# Patient Record
Sex: FEMALE | Race: WHITE | NOT HISPANIC OR LATINO | Employment: FULL TIME | URBAN - NONMETROPOLITAN AREA
[De-identification: names, ages, dates, MRNs, and addresses within clinical notes are randomized per-mention and may not be internally consistent; named-entity substitution may affect disease eponyms.]

---

## 2017-03-11 ENCOUNTER — OFFICE VISIT (OUTPATIENT)
Dept: URGENT CARE | Facility: PHYSICIAN GROUP | Age: 52
End: 2017-03-11
Payer: COMMERCIAL

## 2017-03-11 VITALS
DIASTOLIC BLOOD PRESSURE: 86 MMHG | WEIGHT: 207 LBS | SYSTOLIC BLOOD PRESSURE: 132 MMHG | TEMPERATURE: 98.8 F | HEART RATE: 91 BPM | OXYGEN SATURATION: 95 % | BODY MASS INDEX: 37.85 KG/M2

## 2017-03-11 DIAGNOSIS — R07.89 CHEST WALL PAIN: ICD-10-CM

## 2017-03-11 DIAGNOSIS — R11.0 NAUSEA: ICD-10-CM

## 2017-03-11 DIAGNOSIS — R42 LIGHT HEADED: ICD-10-CM

## 2017-03-11 DIAGNOSIS — R07.89 CHEST PRESSURE: ICD-10-CM

## 2017-03-11 PROCEDURE — 99214 OFFICE O/P EST MOD 30 MIN: CPT | Performed by: PHYSICIAN ASSISTANT

## 2017-03-11 RX ORDER — DICLOFENAC SODIUM 75 MG/1
75 TABLET, DELAYED RELEASE ORAL 2 TIMES DAILY PRN
Qty: 30 TAB | Refills: 0 | Status: SHIPPED | OUTPATIENT
Start: 2017-03-11 | End: 2017-07-27 | Stop reason: SDUPTHER

## 2017-03-11 RX ORDER — ASPIRIN 81 MG/1
324 TABLET, CHEWABLE ORAL ONCE
Status: COMPLETED | OUTPATIENT
Start: 2017-03-11 | End: 2017-03-11

## 2017-03-11 RX ADMIN — ASPIRIN 324 MG: 81 TABLET, CHEWABLE ORAL at 10:59

## 2017-03-11 NOTE — MR AVS SNAPSHOT
Larisa Adamrelljaneen Davis   3/11/2017 9:10 AM   Office Visit   MRN: [id number]    Department:  [facility]   Dept Phone:  [phone]    Description:  Female : 1965   Provider:  Araseli Latif PA-C           Reason for Visit     Chest Pressure x 4 days / dizzy lightheaded this morning      Allergies as of 3/11/2017     Allergen Noted Reactions    Pcn [Penicillins] 2010   Rash      You were diagnosed with     Chest pressure   [184641]       Nausea   [395471]       Chest wall pain   [800653]       Light headed   [524511]         Vital Signs     Blood Pressure Pulse Temperature Weight Oxygen Saturation Smoking Status    132/86 mmHg 91 37.1 °C (98.8 °F) 93.895 kg (207 lb) 95% Former Smoker      Basic Information     Date Of Birth Sex Race Ethnicity Preferred Language    1965 Female White Non- English      Problem List              ICD-10-CM Priority Class Noted - Resolved    GERD (gastroesophageal reflux disease) (Chronic) K21.9   2010 - Present    Vitamin D deficiency (Chronic) E55.9   2010 - Present    Hypoglycemia E16.2   2010 - Present    Fatigue (Chronic) R53.83   2010 - Present    Primary snoring (Chronic) R06.83   2010 - Present    Lactose intolerance E73.9   2010 - Present    Nocturnal muscle spasm M62.838   2010 - Present    Menopause, premature E28.319   2012 - Present    Obese E66.9   2012 - Present    Heart palpitations R00.2   2013 - Present    Undiagnosed cardiac murmurs R01.1   2013 - Present    Anxiety F41.9   10/8/2013 - Present    Right knee pain M25.561   10/27/2014 - Present    HTN (hypertension) I10   12/15/2014 - Present    Headache R51   12/15/2014 - Present    Hot flashes R23.2   12/15/2014 - Present    Right low back pain M54.5   12/15/2014 - Present    BMI 32.0-32.9,adult Z68.32   2015 - Present    Sleep apnea G47.30   2015 - Present    LFT elevation R79.89   2015 - Present    Prediabetes R73.03   7/31/2015 - Present    Enlarged liver R16.0   8/6/2015 - Present    Abnormal mammogram R92.8   7/15/2016 - Present      Health Maintenance        Date Due Completion Dates    IMM DTaP/Tdap/Td Vaccine (1 - Tdap) 1/1/1985 ---    IMM INFLUENZA (1) 9/1/2016 12/28/2010    MAMMOGRAM 7/13/2017 7/13/2016, 7/13/2016, 7/13/2016, 7/13/2016, 7/13/2016, 5/1/2014 (Prv Comp), 5/16/2013 (Done), 3/22/2012 (Prv Comp)    Override on 5/1/2014: Previously completed ([facility])    Override on 5/16/2013: Done ([facility], no suspicious masses or calcifications are seen, repeat in one year)    Override on 3/22/2012: Previously completed ([facility])    PAP SMEAR 7/13/2019 7/13/2016, 2/24/2014, 2/3/2011    COLONOSCOPY 12/21/2025 12/21/2015            Current Immunizations     Influenza Vaccine Pediatric 12/28/2010      Below and/or attached are the medications your provider expects you to take. Review all of your home medications and newly ordered medications with your provider and/or pharmacist. Follow medication instructions as directed by your provider and/or pharmacist. Please keep your medication list with you and share with your provider. Update the information when medications are discontinued, doses are changed, or new medications (including over-the-counter products) are added; and carry medication information at all times in the event of emergency situations     Allergies:  PCN - Rash               Medications  Valid as of: March 11, 2017 - 10:07 AM    Generic Name Brand Name Tablet Size Instructions for use    ALPRAZolam (Tab) XANAX 0.5 MG Take 1 Tab by mouth 2 times a day as needed for Sleep or Anxiety.        Calcium Citrate-Vitamin D   Take  by mouth.        Cholecalciferol   Take  by mouth.        Diclofenac Sodium (Tablet Delayed Response) VOLTAREN 75 MG Take 1 Tab by mouth 2 times a day as needed (pain).        Fluticasone Propionate (Suspension) FLONASE 50 MCG/ACT Spray 1 Spray in nose  every day.        Lisinopril (Tab) PRINIVIL 10 MG TAKE 1 TABLET BY MOUTH DAILY        Losartan Potassium (Tab) COZAAR 25 MG TAKE 1 TABLET BY MOUTH DAILY        Magnesium (Cap) Magnesium 100 MG Take  by mouth.        Misc. Devices (Misc) Misc. Devices  New CPAP mask and supplies; dog chewed up mask; fax to [facility]        Multiple Vitamin   Take  by mouth.        Omeprazole (CAPSULE DELAYED RELEASE) PRILOSEC 40 MG TAKE 1 CAPSULE BY MOUTH DAILY        Propranolol HCl (CAPSULE SR 24 HR) INDERAL LA 80 MG Take 1 Cap by mouth every day.        .                 Medicines prescribed today were sent to:     [facility] - [facility] [address] [facility]    [address] [place] [id number]    Phone: [phone] Fax: [phone]    Open 24 Hours?: No    [id number] - [place], [place] - [address] [place] [address] [address]    [address] [facility] [address]    Phone: [phone] Fax: [phone]    Open 24 Hours?: No      Medication refill instructions:       If your prescription bottle indicates you have medication refills left, it is not necessary to call your provider’s office. Please contact your pharmacy and they will refill your medication.    If your prescription bottle indicates you do not have any refills left, you may request refills at any time through one of the following ways: The online [employer] system (except Urgent Care), by calling your provider’s office, or by asking your pharmacy to contact your provider’s office with a refill request. Medication refills are processed only during regular business hours and may not be available until the next business day. Your provider may request additional information or to have a follow-up visit with you prior to refilling your medication.   *Please Note: Medication refills are assigned a new Rx number when refilled electronically. Your pharmacy may indicate that no refills were authorized even though a new prescription for the same  medication is available at the pharmacy. Please request the medicine by name with the pharmacy before contacting your provider for a refill.        Other Notes About Your Plan     GI saw patient. Evaluation for liver disease.           MyChart Access Code: Activation code not generated  Current MyChart Status: Active

## 2017-03-11 NOTE — PROGRESS NOTES
Chief Complaint   Patient presents with   • Chest Pressure     x 4 days / dizzy lightheaded this morning       HISTORY OF PRESENT ILLNESS: Patient is a 52 y.o. female who presents today for evaluation of chest pressure that started Wednesday, 3/1/17. Patient states that she feels like there is a weight on her chest. 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 She has not exerted herself since then so does not know if it's worse with exertion. She does say she is more short of breath when she walks around but denies any worsening pressure. She reports associated nausea, diaphoresis, and slight shortness of breath. It is better when she is sitting up or leaning forward and worse when she is supine. She denies any orthopnea, PND, and leg swelling. She does wear CPAP every night. The pain and pressure has been fairly constant since it started. She does state this is a little bit better today but reports slightly worsening shortness of breath. She reports feeling slightly lightheaded yesterday but has not had any today. She denies any recent respiratory infection symptoms except for a cough that started yesterday. She has a history of the same symptoms and was evaluated 9/29/14. She denies any personal or family history of cardiac issues although states she does not know her father. She denies any radiating pain to her arm, jaw, neck, and back. She has a 5-6 pack smoking history but quit 20 years ago. She previously drank 2 drinks at night for 3-4 years and stopped around 2016. She denies any history of illicit drug use. She has not taken any aspirin today but took one 325 mg tablet yesterday.    Patient Active Problem List    Diagnosis Date Noted   • Abnormal mammogram 07/15/2016   • Enlarged liver 08/06/2015   • Prediabetes 07/31/2015   • LFT elevation 07/11/2015   • BMI 32.0-32.9,adult 07/02/2015   • Sleep apnea 07/02/2015   • HTN (hypertension) 12/15/2014   • Headache 12/15/2014   • Hot flashes 12/15/2014   • Right low back  pain 12/15/2014   • Right knee pain 10/27/2014   • Anxiety 10/08/2013   • Undiagnosed cardiac murmurs 04/30/2013   • Heart palpitations 04/09/2013   • Menopause, premature 07/24/2012   • Obese 07/24/2012   • GERD (gastroesophageal reflux disease) 12/28/2010   • Vitamin D deficiency 12/28/2010   • Hypoglycemia 12/28/2010   • Fatigue 12/28/2010   • Primary snoring 12/28/2010   • Lactose intolerance 12/28/2010   • Nocturnal muscle spasm 12/28/2010       Allergies:Pcn    Current Outpatient Prescriptions Ordered in [facility]   Medication Sig Dispense Refill   • propranolol CR (INDERAL LA) 80 MG CAPSULE SR 24 HR Take 1 Cap by mouth every day. 90 Cap 0   • Multiple Vitamin (MULTI VITAMIN DAILY PO) Take  by mouth.     • Cholecalciferol (VITAMIN D PO) Take  by mouth.     • Magnesium 100 MG Cap Take  by mouth.     • Calcium Carbonate-Vitamin D (CALCIUM + D PO) Take  by mouth.     • fluticasone (FLONASE) 50 MCG/ACT nasal spray Spray 1 Spray in nose every day. 16 g 11   • alprazolam (XANAX) 0.5 MG TABS Take 1 Tab by mouth 2 times a day as needed for Sleep or Anxiety. 60 Tab 2   • losartan (COZAAR) 25 MG Tab TAKE 1 TABLET BY MOUTH DAILY 90 Tab 0   • omeprazole (PRILOSEC) 40 MG delayed-release capsule TAKE 1 CAPSULE BY MOUTH DAILY 90 Cap 0   • Misc. Devices MISC New CPAP mask and supplies; dog chewed up mask; fax to [facility] 1 Device 3   • lisinopril (PRINIVIL) 10 MG TABS TAKE 1 TABLET BY MOUTH DAILY 30 Tab 2     Current Facility-Administered Medications Ordered in Epic   Medication Dose Route Frequency Provider Last Rate Last Dose   • aspirin (ASA) chewable tab 324 mg  324 mg Oral Once Araseli Latif PA-C           Past Medical History   Diagnosis Date   • GERD (gastroesophageal reflux disease) 12/28/2010   • Unspecified vitamin D deficiency 12/28/2010   • Hypoglycemia 12/28/2010   • Fatigue 12/28/2010   • Primary snoring 12/28/2010   • Lactose intolerance 12/28/2010   • Nocturnal muscle spasm 12/28/2010   • Irregular menses  2010   • Menorrhagia 2011   • Menopause, premature 2012       Social History   Substance Use Topics   • Smoking status: Former Smoker -- 1.00 packs/day for 10 years     Quit date: 1989   • Smokeless tobacco: Never Used   • Alcohol Use: 7.0 oz/week     14 Glasses of wine per week       Family Status   Relation Status Death Age   • Mother Alive      76 in    • Father  30     suicide   • Maternal Grandfather  93     colon cancer     Family History   Problem Relation Age of Onset   • Non-contributory Mother      retinal detachment   • Cancer Maternal Grandfather 92     colon cancer       ROS:   Review of Systems   Constitutional: Negative for fever, chills, weight loss and malaise/fatigue.   HENT: Negative for ear pain, nosebleeds, congestion, sore throat and neck pain.    Eyes: Negative for blurred vision.   Respiratory: Negative for cough, sputum production, shortness of breath and wheezing.    Cardiovascular: Negative for palpitations, orthopnea and leg swelling.   Gastrointestinal: Negative for heartburn, nausea, vomiting and abdominal pain.   Genitourinary: Negative for dysuria, urgency and frequency.       Exam:  Blood pressure 132/86, pulse 91, temperature 37.1 °C (98.8 °F), weight 93.895 kg (207 lb), SpO2 95 %.  General: Normal appearing. No distress.  HEENT: Conjunctiva clear, lids without ptosis, ears normal shape and contour, canals are clear bilaterally, tympanic membranes are benign, nasal mucosa benign, oropharynx is without erythema, edema or exudates.  Neck: No carotid bruits noted.  Pulmonary: Clear to ausculation and percussion.  Normal effort. No rales, ronchi, or wheezing.   Cardiovascular: Regular rate and rhythm without murmur.    Chest: Chest wall TTP centrally and over the left side of the chest.   Abdomen: Soft, nondistended, nontender to palpation. Bowel sounds within normal limits. No pulsatile masses noted.  Neurologic: Grossly nonfocal.  Extremities:  No lower extremity edema noted.  Skin: No obvious lesions.  Psych: Normal mood. Alert and oriented x3. Judgment and insight is normal.    EKG, per my interpretation: Normal sinus rhythm, normal axis, no ST elevation/depression.     mg, chewable    Assessment/Plan:  Patient's vital signs are within normal limits. She is not on any medication that puts her at higher risk for thromboembolism.  Patient has no known cardiac history. Advised patient that we are unable to rule out any cardiac issue in the clinic. Given her history and presenting symptoms, this seems to be musculoskeletal in nature. Will try anti-inflammatories but have discussed strict ER precautions for any worsening of her symptoms.   1. Chest pressure  POCT EKG    aspirin (ASA) chewable tab 324 mg   2. Nausea  POCT EKG    aspirin (ASA) chewable tab 324 mg   3. Chest wall pain  POCT EKG    aspirin (ASA) chewable tab 324 mg   4. Light headed  POCT EKG    aspirin (ASA) chewable tab 324 mg

## 2017-03-27 ENCOUNTER — TELEPHONE (OUTPATIENT)
Dept: MEDICAL GROUP | Facility: PHYSICIAN GROUP | Age: 52
End: 2017-03-27

## 2017-03-27 DIAGNOSIS — K21.9 GASTROESOPHAGEAL REFLUX DISEASE WITHOUT ESOPHAGITIS: Chronic | ICD-10-CM

## 2017-03-27 DIAGNOSIS — I10 ESSENTIAL HYPERTENSION: ICD-10-CM

## 2017-03-27 RX ORDER — OMEPRAZOLE 40 MG/1
CAPSULE, DELAYED RELEASE ORAL
Qty: 90 CAP | Refills: 0 | Status: SHIPPED | OUTPATIENT
Start: 2017-03-27 | End: 2017-06-21 | Stop reason: SDUPTHER

## 2017-03-27 RX ORDER — PROPRANOLOL HYDROCHLORIDE 80 MG/1
80 CAPSULE, EXTENDED RELEASE ORAL DAILY
Qty: 90 CAP | Refills: 0 | Status: SHIPPED | OUTPATIENT
Start: 2017-03-27 | End: 2017-06-21 | Stop reason: SDUPTHER

## 2017-03-27 NOTE — TELEPHONE ENCOUNTER
Was the patient seen in the last year in this department? No    Does patient have an active prescription for medications requested? No     Received Request Via: Pharmacy      Pt met protocol?: No, OV 7/15, GERD discussed.

## 2017-03-27 NOTE — TELEPHONE ENCOUNTER
Last seen by PCP 7/15. Labs 7/16.Will send 3 months to pharmacy. Patient is due for an appointment. Please schedule.

## 2017-03-27 NOTE — TELEPHONE ENCOUNTER
Was the patient seen in the last year in this department? No    Does patient have an active prescription for medications requested? No     Received Request Via: Pharmacy      Pt met protocol?: No, last OV 7/15   BP Readings from Last 1 Encounters:   03/11/17 132/86

## 2017-06-21 ENCOUNTER — OFFICE VISIT (OUTPATIENT)
Dept: MEDICAL GROUP | Facility: PHYSICIAN GROUP | Age: 52
End: 2017-06-21
Payer: COMMERCIAL

## 2017-06-21 VITALS
HEART RATE: 85 BPM | TEMPERATURE: 97.9 F | SYSTOLIC BLOOD PRESSURE: 118 MMHG | DIASTOLIC BLOOD PRESSURE: 80 MMHG | BODY MASS INDEX: 38.83 KG/M2 | OXYGEN SATURATION: 94 % | WEIGHT: 211 LBS | RESPIRATION RATE: 14 BRPM | HEIGHT: 62 IN

## 2017-06-21 DIAGNOSIS — H93.8X2 EAR PRESSURE, LEFT: ICD-10-CM

## 2017-06-21 DIAGNOSIS — E66.9 OBESITY (BMI 35.0-39.9 WITHOUT COMORBIDITY): ICD-10-CM

## 2017-06-21 DIAGNOSIS — Z12.39 BREAST CANCER SCREENING: ICD-10-CM

## 2017-06-21 DIAGNOSIS — Z13.29 SCREENING FOR THYROID DISORDER: ICD-10-CM

## 2017-06-21 DIAGNOSIS — K21.9 GASTROESOPHAGEAL REFLUX DISEASE WITHOUT ESOPHAGITIS: Chronic | ICD-10-CM

## 2017-06-21 DIAGNOSIS — R73.01 IMPAIRED FASTING GLUCOSE: ICD-10-CM

## 2017-06-21 DIAGNOSIS — F41.9 ANXIETY: ICD-10-CM

## 2017-06-21 DIAGNOSIS — I10 ESSENTIAL HYPERTENSION: ICD-10-CM

## 2017-06-21 DIAGNOSIS — G47.00 INSOMNIA, UNSPECIFIED TYPE: ICD-10-CM

## 2017-06-21 DIAGNOSIS — Z13.220 SCREENING FOR LIPID DISORDERS: ICD-10-CM

## 2017-06-21 DIAGNOSIS — K58.2 IRRITABLE BOWEL SYNDROME WITH BOTH CONSTIPATION AND DIARRHEA: ICD-10-CM

## 2017-06-21 PROCEDURE — 99214 OFFICE O/P EST MOD 30 MIN: CPT | Performed by: FAMILY MEDICINE

## 2017-06-21 RX ORDER — OMEPRAZOLE 40 MG/1
CAPSULE, DELAYED RELEASE ORAL
Qty: 90 CAP | Refills: 0 | Status: SHIPPED | OUTPATIENT
Start: 2017-06-21 | End: 2017-06-21 | Stop reason: SDUPTHER

## 2017-06-21 RX ORDER — FLUTICASONE PROPIONATE 50 MCG
1 SPRAY, SUSPENSION (ML) NASAL DAILY
Qty: 16 G | Refills: 11 | Status: SHIPPED | OUTPATIENT
Start: 2017-06-21 | End: 2017-06-21 | Stop reason: SDUPTHER

## 2017-06-21 RX ORDER — PROPRANOLOL HYDROCHLORIDE 80 MG/1
80 CAPSULE, EXTENDED RELEASE ORAL DAILY
Qty: 90 CAP | Refills: 3 | Status: SHIPPED | OUTPATIENT
Start: 2017-06-21 | End: 2018-06-24 | Stop reason: SDUPTHER

## 2017-06-21 RX ORDER — OMEPRAZOLE 40 MG/1
CAPSULE, DELAYED RELEASE ORAL
Qty: 90 CAP | Refills: 3 | Status: SHIPPED | OUTPATIENT
Start: 2017-06-21 | End: 2019-03-08 | Stop reason: SDUPTHER

## 2017-06-21 RX ORDER — FLUTICASONE PROPIONATE 50 MCG
1 SPRAY, SUSPENSION (ML) NASAL DAILY
Qty: 16 G | Refills: 11 | Status: SHIPPED | OUTPATIENT
Start: 2017-06-21 | End: 2019-03-08 | Stop reason: SDUPTHER

## 2017-06-21 RX ORDER — POLYETHYLENE GLYCOL 3350 17 G/17G
17 POWDER, FOR SOLUTION ORAL ONCE
Qty: 30 EACH | Refills: 0 | Status: SHIPPED | OUTPATIENT
Start: 2017-06-21 | End: 2017-06-21

## 2017-06-21 RX ORDER — ALPRAZOLAM 0.5 MG/1
0.5 TABLET ORAL 2 TIMES DAILY PRN
Qty: 30 TAB | Refills: 1 | Status: SHIPPED | OUTPATIENT
Start: 2017-06-21 | End: 2019-03-08 | Stop reason: SDUPTHER

## 2017-06-21 NOTE — ASSESSMENT & PLAN NOTE
She uses alprazolam 0.5 mg maybe three times a month for anxiety that interferes with sleep   reviewed last rx in 2015. She uses it very infrequently.   Denies any use of illegal substances.   Prescription given for 30 pills for one year.

## 2017-06-21 NOTE — ASSESSMENT & PLAN NOTE
She has chronic symptoms of IBS with diarrhea and constipation. She was told she has lactose intolerance, reviewed diet to avoid honey, high fructose corn syrup, trial of avoiding gluten and lactose, continue with fiber supplements which seem to help. rx PEG (miralax)  She has no blood in the stool. Has been trying to lose weight.

## 2017-06-21 NOTE — PROGRESS NOTES
Subjective:   Larisa Davis is a 52 y.o. female here today to discuss means for weight loss, HTN, anxiety, IBS.     HTN (hypertension)  Well controlled.   Continues on propranolol 80mg SR      Irritable bowel syndrome with both constipation and diarrhea  She has chronic symptoms of IBS with diarrhea and constipation. She was told she has lactose intolerance, reviewed diet to avoid honey, high fructose corn syrup, trial of avoiding gluten and lactose, continue with fiber supplements which seem to help. rx PEG (miralax)  She has no blood in the stool. Has been trying to lose weight.       Anxiety  She uses alprazolam 0.5 mg maybe three times a month for anxiety that interferes with sleep   reviewed last rx in 2015. She uses it very infrequently.   Denies any use of illegal substances.   Prescription given for 30 pills for one year.     Obesity (BMI 35.0-39.9 without comorbidity) ([facility])  She requests weight loss medication so she can have weight loss to qualify for life insurance at lower cost. I advised referral to nutritionist first which she declines due to concern insurance will not cover it.   Advised on food diary, balanced small frequent meals, moderation of high calorie foods like nuts and dried fruit, avoid processed foods, drink only water (she does use honey and creamer and half and half in coffee),  reduction in portion sizes, starting regular exercise with even 30 min walking or joining a gym with an exercise sreekanth. She tries to get her  to exercise with her but it's difficult to get him off the couch.         She has noticed some dizziness if she uses flonase for allergies more than 4 days in a row. Advised using nasal saline instead of flonase every 3-4 days.     Current medicines (including changes today)  Current Outpatient Prescriptions   Medication Sig Dispense Refill   • propranolol CR (INDERAL LA) 80 MG CAPSULE SR 24 HR Take 1 Cap by mouth every day. 90 Cap 3   • alprazolam  "(XANAX) 0.5 MG Tab Take 1 Tab by mouth 2 times a day as needed for Sleep or Anxiety. 30 Tab 1   • omeprazole (PRILOSEC) 40 MG delayed-release capsule TAKE 1 CAPSULE BY MOUTH DAILY 90 Cap 3   • fluticasone (FLONASE) 50 MCG/ACT nasal spray Spray 1 Spray in nose every day. 16 g 11   • polyethylene glycol/lytes (MIRALAX) Pack Take 1 Packet by mouth Once for 1 dose. 30 Each 0   • Multiple Vitamin (MULTI VITAMIN DAILY PO) Take  by mouth.     • Cholecalciferol (VITAMIN D PO) Take  by mouth.     • Magnesium 100 MG Cap Take  by mouth.     • Calcium Carbonate-Vitamin D (CALCIUM + D PO) Take  by mouth.     • diclofenac EC (VOLTAREN) 75 MG Tablet Delayed Response Take 1 Tab by mouth 2 times a day as needed (pain). 30 Tab 0   • Misc. Devices MISC New CPAP mask and supplies; dog chewed up mask; fax to [facility] 1 Device 3     No current facility-administered medications for this visit.     She  has a past medical history of GERD (gastroesophageal reflux disease) (12/28/2010); Unspecified vitamin D deficiency (12/28/2010); Hypoglycemia (12/28/2010); Fatigue (12/28/2010); Primary snoring (12/28/2010); Lactose intolerance (12/28/2010); Nocturnal muscle spasm (12/28/2010); Irregular menses (12/28/2010); Menorrhagia (1/18/2011); and Menopause, premature (7/24/2012).    ROS   No chest pain, no shortness of breath, no abdominal pain       Objective:     Blood pressure 118/80, pulse 85, temperature 36.6 °C (97.9 °F), resp. rate 14, height 1.575 m (5' 2\"), weight 95.709 kg (211 lb), SpO2 94 %. Body mass index is 38.58 kg/(m^2).  Physical Exam:  Constitutional: Alert, no distress.  Skin: Warm, dry, good turgor, no rashes in visible areas.  Eye: Equal, round and reactive, conjunctiva clear, lids normal.  ENMT: Lips without lesions, good dentition, oropharynx clear.  Neck: Trachea midline, no masses, no thyromegaly. No cervical or supraclavicular lymphadenopathy  Respiratory: Unlabored respiratory effort, lungs clear to auscultation, no " wheezes, no ronchi.  Cardiovascular: Normal S1, S2, no murmur, no edema.  Abdomen: Soft, non-tender, no masses, no hepatosplenomegaly.  Psych: Alert and oriented x3, normal affect and mood.        Assessment and Plan:   The following treatment plan was discussed    1. Essential hypertension  Continue current medication  - propranolol CR (INDERAL LA) 80 MG CAPSULE SR 24 HR; Take 1 Cap by mouth every day.  Dispense: 90 Cap; Refill: 3  - COMP METABOLIC PANEL; Future    2. Gastroesophageal reflux disease without esophagitis  Controlled with prilosec  - omeprazole (PRILOSEC) 40 MG delayed-release capsule; TAKE 1 CAPSULE BY MOUTH DAILY  Dispense: 90 Cap; Refill: 3    3. Ear pressure, left  Continue flonase  - fluticasone (FLONASE) 50 MCG/ACT nasal spray; Spray 1 Spray in nose every day.  Dispense: 16 g; Refill: 11    4. Anxiety  Controlled with very infrequent use of xanax. This prescription for 30 pills should last about a year.   - alprazolam (XANAX) 0.5 MG Tab; Take 1 Tab by mouth 2 times a day as needed for Sleep or Anxiety.  Dispense: 30 Tab; Refill: 1    5. Breast cancer screening  - MA-SCREEN MAMMO W/CAD-BILAT; Future    6. Screening for lipid disorders  - LIPID PROFILE; Future    7. Screening for thyroid disorder  - TSH WITH REFLEX TO FT4; Future  - CBC WITH DIFFERENTIAL; Future    8. Impaired fasting glucose  - HEMOGLOBIN A1C; Future  We had a discussion on diet changes, portion control, food diary, increasing activity.     10. Irritable bowel syndrome with both constipation and diarrhea  Continue with fiber supplementation.   - polyethylene glycol/lytes (MIRALAX) Pack; Take 1 Packet by mouth Once for 1 dose.  Dispense: 30 Each; Refill: 0    11. Obesity (BMI 35.0-39.9 without comorbidity) ([facility])  - Patient identified as having weight management issue.  Appropriate orders and counseling given.  If no improvement in weight and labs normal, will give patient 3 -6 months of phentramine but advised her it's better to  achieve gradual weight loss with lifestyle modification which is more sustainable.       Followup: Return in about 3 months (around 9/21/2017) for weight check.

## 2017-06-21 NOTE — ASSESSMENT & PLAN NOTE
She requests weight loss medication so she can have weight loss to qualify for life insurance at lower cost. I advised referral to nutritionist first which she declines due to concern insurance will not cover it.   Advised on food diary, balanced small frequent meals, moderation of high calorie foods like nuts and dried fruit, avoid processed foods, drink only water (she does use honey and creamer and half and half in coffee),  reduction in portion sizes, starting regular exercise with even 30 min walking or joining a gym with an exercise sreekanth. She tries to get her  to exercise with her but it's difficult to get him off the couch.

## 2017-06-21 NOTE — MR AVS SNAPSHOT
"        Larisa Davis   2017 1:00 PM   Office Visit   MRN: [id number]    Department:  [facility]   Dept Phone:  [phone]    Description:  Female : 1965   Provider:  Christian Milner M.D.           Reason for Visit     Establish Care medication refills      Allergies as of 2017     Allergen Noted Reactions    Pcn [Penicillins] 2010   Rash      You were diagnosed with     Essential hypertension   [9226194]       Gastroesophageal reflux disease without esophagitis   [895181]       Ear pressure, left   [0710509]   New problem of otitis media left ear Z-Bryan as directed, nasal spray, and    Anxiety   [419119]   Controlled with 0.5 mg, one half tab as needed. She usually only takes this at most once a day but does not take it everyday.    Insomnia, unspecified type   [9653501]       Breast cancer screening   [615941]       Screening for lipid disorders   [1798884]       Screening for thyroid disorder   [V77.0.ICD-9-CM]       Impaired fasting glucose   [790.21.ICD-9-CM]       Irritable bowel syndrome with both constipation and diarrhea   [4245112]         Vital Signs     Blood Pressure Pulse Temperature Respirations Height Weight    118/80 mmHg 85 36.6 °C (97.9 °F) 14 1.575 m (5' 2\") 95.709 kg (211 lb)    Body Mass Index Oxygen Saturation Smoking Status             38.58 kg/m2 94% Former Smoker         Basic Information     Date Of Birth Sex Race Ethnicity Preferred Language    1965 Female White Non- English      Your appointments     Sep 21, 2017  3:40 PM   Established Patient with Christian Milner M.D.   [address] [address] [phone]           You will be receiving a confirmation call a few days before your appointment from our automated call confirmation system.              Problem List              ICD-10-CM Priority Class Noted - Resolved    GERD (gastroesophageal reflux disease) (Chronic) " K21.9   12/28/2010 - Present    Vitamin D deficiency (Chronic) E55.9   12/28/2010 - Present    Hypoglycemia E16.2   12/28/2010 - Present    Fatigue (Chronic) R53.83   12/28/2010 - Present    Primary snoring (Chronic) R06.83   12/28/2010 - Present    Lactose intolerance E73.9   12/28/2010 - Present    Nocturnal muscle spasm M62.838   12/28/2010 - Present    Menopause, premature E28.319   7/24/2012 - Present    Obese E66.9   7/24/2012 - Present    Heart palpitations R00.2   4/9/2013 - Present    Undiagnosed cardiac murmurs R01.1   4/30/2013 - Present    Anxiety F41.9   10/8/2013 - Present    Right knee pain M25.561   10/27/2014 - Present    HTN (hypertension) I10   12/15/2014 - Present    Headache R51   12/15/2014 - Present    Hot flashes R23.2   12/15/2014 - Present    Right low back pain M54.5   12/15/2014 - Present    BMI 32.0-32.9,adult Z68.32   7/2/2015 - Present    Sleep apnea G47.30   7/2/2015 - Present    LFT elevation R94.5   7/11/2015 - Present    Prediabetes R73.03   7/31/2015 - Present    Enlarged liver R16.0   8/6/2015 - Present    Abnormal mammogram R92.8   7/15/2016 - Present    Irritable bowel syndrome with both constipation and diarrhea K58.2   6/21/2017 - Present      Health Maintenance        Date Due Completion Dates    IMM DTaP/Tdap/Td Vaccine (1 - Tdap) 1/1/1985 ---    MAMMOGRAM 7/13/2017 7/13/2016, 5/1/2014 (Prv Comp), 5/16/2013 (Done), 3/22/2012 (Prv Comp)    Override on 5/1/2014: Previously completed ([facility])    Override on 5/16/2013: Done ([facility], no suspicious masses or calcifications are seen, repeat in one year)    Override on 3/22/2012: Previously completed ([facility])    PAP SMEAR 7/13/2019 7/13/2016, 2/24/2014, 2/3/2011    COLONOSCOPY 12/21/2025 12/21/2015            Current Immunizations     Influenza Vaccine Pediatric 12/28/2010      Below and/or attached are the medications your provider expects you to take. Review all of your home medications and newly ordered  medications with your provider and/or pharmacist. Follow medication instructions as directed by your provider and/or pharmacist. Please keep your medication list with you and share with your provider. Update the information when medications are discontinued, doses are changed, or new medications (including over-the-counter products) are added; and carry medication information at all times in the event of emergency situations     Allergies:  PCN - Rash               Medications  Valid as of: June 21, 2017 -  1:56 PM    Generic Name Brand Name Tablet Size Instructions for use    ALPRAZolam (Tab) XANAX 0.5 MG Take 1 Tab by mouth 2 times a day as needed for Sleep or Anxiety.        Calcium Citrate-Vitamin D   Take  by mouth.        Cholecalciferol   Take  by mouth.        Diclofenac Sodium (Tablet Delayed Response) VOLTAREN 75 MG Take 1 Tab by mouth 2 times a day as needed (pain).        Fluticasone Propionate (Suspension) FLONASE 50 MCG/ACT Spray 1 Spray in nose every day.        Magnesium (Cap) Magnesium 100 MG Take  by mouth.        Misc. Devices (Misc) Misc. Devices  New CPAP mask and supplies; dog chewed up mask; fax to [facility]        Multiple Vitamin   Take  by mouth.        Omeprazole (CAPSULE DELAYED RELEASE) PRILOSEC 40 MG TAKE 1 CAPSULE BY MOUTH DAILY        Polyethylene Glycol 3350 (Pack) MIRALAX  Take 1 Packet by mouth Once for 1 dose.        Propranolol HCl (CAPSULE SR 24 HR) INDERAL LA 80 MG Take 1 Cap by mouth every day.        .                 Medicines prescribed today were sent to:     [facility] [place] - [address] [facility]    [address] [place] [id number]    Phone: [phone] Fax: [phone]    Open 24 Hours?: No    [id number] - [place] - [address] [facility] [address] [facility] [address] [facility] [address]    [address] [facility] [address] [facility] [address]    Phone: [phone] Fax: [phone]    Open 24 Hours?: No      Medication refill instructions:       If your prescription bottle  indicates you have medication refills left, it is not necessary to call your provider’s office. Please contact your pharmacy and they will refill your medication.    If your prescription bottle indicates you do not have any refills left, you may request refills at any time through one of the following ways: The online uberVU system (except Urgent Care), by calling your provider’s office, or by asking your pharmacy to contact your provider’s office with a refill request. Medication refills are processed only during regular business hours and may not be available until the next business day. Your provider may request additional information or to have a follow-up visit with you prior to refilling your medication.   *Please Note: Medication refills are assigned a new Rx number when refilled electronically. Your pharmacy may indicate that no refills were authorized even though a new prescription for the same medication is available at the pharmacy. Please request the medicine by name with the pharmacy before contacting your provider for a refill.        Your To Do List     Future Labs/Procedures Complete By Expires    CBC WITH DIFFERENTIAL  As directed 6/22/2018    COMP METABOLIC PANEL  As directed 6/22/2018    HEMOGLOBIN A1C  As directed 6/22/2018    LIPID PROFILE  As directed 6/22/2018    MA-SCREEN MAMMO W/CAD-BILAT  As directed 6/21/2018    TSH WITH REFLEX TO FT4  As directed 6/21/2018      Other Notes About Your Plan     GI saw patient. Evaluation for liver disease.           uberVU Access Code: Activation code not generated  Current uberVU Status: Active

## 2017-07-24 ENCOUNTER — HOSPITAL ENCOUNTER (OUTPATIENT)
Dept: LAB | Facility: MEDICAL CENTER | Age: 52
End: 2017-07-24
Attending: FAMILY MEDICINE
Payer: COMMERCIAL

## 2017-07-24 DIAGNOSIS — I10 ESSENTIAL HYPERTENSION: ICD-10-CM

## 2017-07-24 DIAGNOSIS — Z13.29 SCREENING FOR THYROID DISORDER: ICD-10-CM

## 2017-07-24 DIAGNOSIS — Z13.220 SCREENING FOR LIPID DISORDERS: ICD-10-CM

## 2017-07-24 DIAGNOSIS — R73.01 IMPAIRED FASTING GLUCOSE: ICD-10-CM

## 2017-07-24 LAB
ALBUMIN SERPL BCP-MCNC: 4.4 G/DL (ref 3.2–4.9)
ALBUMIN/GLOB SERPL: 1.8 G/DL
ALP SERPL-CCNC: 89 U/L (ref 30–99)
ALT SERPL-CCNC: 32 U/L (ref 2–50)
ANION GAP SERPL CALC-SCNC: 5 MMOL/L (ref 0–11.9)
AST SERPL-CCNC: 22 U/L (ref 12–45)
BASOPHILS # BLD AUTO: 1.1 % (ref 0–1.8)
BASOPHILS # BLD: 0.06 K/UL (ref 0–0.12)
BILIRUB SERPL-MCNC: 0.6 MG/DL (ref 0.1–1.5)
BUN SERPL-MCNC: 15 MG/DL (ref 8–22)
CALCIUM SERPL-MCNC: 9.4 MG/DL (ref 8.5–10.5)
CHLORIDE SERPL-SCNC: 106 MMOL/L (ref 96–112)
CHOLEST SERPL-MCNC: 229 MG/DL (ref 100–199)
CO2 SERPL-SCNC: 28 MMOL/L (ref 20–33)
CREAT SERPL-MCNC: 0.99 MG/DL (ref 0.5–1.4)
EOSINOPHIL # BLD AUTO: 0.12 K/UL (ref 0–0.51)
EOSINOPHIL NFR BLD: 2.1 % (ref 0–6.9)
ERYTHROCYTE [DISTWIDTH] IN BLOOD BY AUTOMATED COUNT: 43.4 FL (ref 35.9–50)
EST. AVERAGE GLUCOSE BLD GHB EST-MCNC: 123 MG/DL
GFR SERPL CREATININE-BSD FRML MDRD: 59 ML/MIN/1.73 M 2
GLOBULIN SER CALC-MCNC: 2.5 G/DL (ref 1.9–3.5)
GLUCOSE SERPL-MCNC: 106 MG/DL (ref 65–99)
HBA1C MFR BLD: 5.9 % (ref 0–5.6)
HCT VFR BLD AUTO: 43.9 % (ref 37–47)
HDLC SERPL-MCNC: 44 MG/DL
HGB BLD-MCNC: 14.6 G/DL (ref 12–16)
IMM GRANULOCYTES # BLD AUTO: 0.05 K/UL (ref 0–0.11)
IMM GRANULOCYTES NFR BLD AUTO: 0.9 % (ref 0–0.9)
LDLC SERPL CALC-MCNC: 143 MG/DL
LYMPHOCYTES # BLD AUTO: 2.04 K/UL (ref 1–4.8)
LYMPHOCYTES NFR BLD: 36.4 % (ref 22–41)
MCH RBC QN AUTO: 31 PG (ref 27–33)
MCHC RBC AUTO-ENTMCNC: 33.3 G/DL (ref 33.6–35)
MCV RBC AUTO: 93.2 FL (ref 81.4–97.8)
MONOCYTES # BLD AUTO: 0.39 K/UL (ref 0–0.85)
MONOCYTES NFR BLD AUTO: 7 % (ref 0–13.4)
NEUTROPHILS # BLD AUTO: 2.94 K/UL (ref 2–7.15)
NEUTROPHILS NFR BLD: 52.5 % (ref 44–72)
NRBC # BLD AUTO: 0 K/UL
NRBC BLD AUTO-RTO: 0 /100 WBC
PLATELET # BLD AUTO: 222 K/UL (ref 164–446)
PMV BLD AUTO: 10.3 FL (ref 9–12.9)
POTASSIUM SERPL-SCNC: 4.2 MMOL/L (ref 3.6–5.5)
PROT SERPL-MCNC: 6.9 G/DL (ref 6–8.2)
RBC # BLD AUTO: 4.71 M/UL (ref 4.2–5.4)
SODIUM SERPL-SCNC: 139 MMOL/L (ref 135–145)
TRIGL SERPL-MCNC: 212 MG/DL (ref 0–149)
TSH SERPL DL<=0.005 MIU/L-ACNC: 3.31 UIU/ML (ref 0.3–3.7)
WBC # BLD AUTO: 5.6 K/UL (ref 4.8–10.8)

## 2017-07-24 PROCEDURE — 85025 COMPLETE CBC W/AUTO DIFF WBC: CPT

## 2017-07-24 PROCEDURE — 84443 ASSAY THYROID STIM HORMONE: CPT

## 2017-07-24 PROCEDURE — 80053 COMPREHEN METABOLIC PANEL: CPT

## 2017-07-24 PROCEDURE — 83036 HEMOGLOBIN GLYCOSYLATED A1C: CPT

## 2017-07-24 PROCEDURE — 36415 COLL VENOUS BLD VENIPUNCTURE: CPT

## 2017-07-24 PROCEDURE — 80061 LIPID PANEL: CPT

## 2017-07-27 DIAGNOSIS — R07.89 CHEST PRESSURE: ICD-10-CM

## 2017-07-27 NOTE — TELEPHONE ENCOUNTER
Was the patient seen in the last year in this department? Yes     Does patient have an active prescription for medications requested? No     Received Request Via: Pharmacy      Pt met protocol?: Yes pt last ov 6/21/17

## 2017-07-28 RX ORDER — DICLOFENAC SODIUM 75 MG/1
75 TABLET, DELAYED RELEASE ORAL 2 TIMES DAILY PRN
Qty: 30 TAB | Refills: 0 | Status: SHIPPED | OUTPATIENT
Start: 2017-07-28 | End: 2017-08-01 | Stop reason: SDUPTHER

## 2017-07-31 RX ORDER — PHENTERMINE HYDROCHLORIDE 15 MG/1
15 CAPSULE ORAL EVERY MORNING
Qty: 30 CAP | Refills: 0 | Status: SHIPPED
Start: 2017-07-31 | End: 2017-11-09

## 2017-08-01 DIAGNOSIS — R07.89 CHEST PRESSURE: ICD-10-CM

## 2017-08-02 RX ORDER — DICLOFENAC SODIUM 75 MG/1
75 TABLET, DELAYED RELEASE ORAL 2 TIMES DAILY PRN
Qty: 30 TAB | Refills: 0 | Status: SHIPPED | OUTPATIENT
Start: 2017-08-02 | End: 2017-10-23

## 2017-08-09 ENCOUNTER — HOSPITAL ENCOUNTER (OUTPATIENT)
Dept: RADIOLOGY | Facility: MEDICAL CENTER | Age: 52
End: 2017-08-09
Attending: FAMILY MEDICINE
Payer: COMMERCIAL

## 2017-08-09 DIAGNOSIS — Z12.31 VISIT FOR SCREENING MAMMOGRAM: ICD-10-CM

## 2017-08-09 PROCEDURE — 77063 BREAST TOMOSYNTHESIS BI: CPT

## 2017-08-21 ENCOUNTER — APPOINTMENT (OUTPATIENT)
Dept: VASCULAR LAB | Facility: MEDICAL CENTER | Age: 52
End: 2017-08-21
Payer: COMMERCIAL

## 2017-10-09 ENCOUNTER — HOSPITAL ENCOUNTER (OUTPATIENT)
Facility: MEDICAL CENTER | Age: 52
End: 2017-10-09
Attending: OBSTETRICS & GYNECOLOGY
Payer: COMMERCIAL

## 2017-10-09 ENCOUNTER — GYNECOLOGY VISIT (OUTPATIENT)
Dept: OBGYN | Facility: CLINIC | Age: 52
End: 2017-10-09
Payer: COMMERCIAL

## 2017-10-09 VITALS
HEIGHT: 62 IN | DIASTOLIC BLOOD PRESSURE: 76 MMHG | WEIGHT: 210 LBS | BODY MASS INDEX: 38.64 KG/M2 | SYSTOLIC BLOOD PRESSURE: 120 MMHG

## 2017-10-09 DIAGNOSIS — Z12.4 SCREENING FOR CERVICAL CANCER: ICD-10-CM

## 2017-10-09 DIAGNOSIS — Z01.419 WELL WOMAN EXAM: ICD-10-CM

## 2017-10-09 DIAGNOSIS — Z11.51 SPECIAL SCREENING EXAMINATION FOR HUMAN PAPILLOMAVIRUS (HPV): ICD-10-CM

## 2017-10-09 PROCEDURE — 87624 HPV HI-RISK TYP POOLED RSLT: CPT

## 2017-10-09 PROCEDURE — 88175 CYTOPATH C/V AUTO FLUID REDO: CPT

## 2017-10-09 PROCEDURE — 99396 PREV VISIT EST AGE 40-64: CPT | Performed by: OBSTETRICS & GYNECOLOGY

## 2017-10-09 NOTE — PROGRESS NOTES
"Larisa Shea Lange52 y.o.  female presents for Annual Well Woman Exam.   Patient is currently without complaints. Patient is   menopausal with last menstrual period 8 years ago.  Denies hot flashes and vaginal dryness but is complaining of some discomfort with intercourse she describes it as something is blocking her  on entry    ROS: Patient is feeling well. No dyspnea or chest pain on exertion. No Abdominal pain, change in bowel habits, black or bloody stools. No urinary sx. GYN ROS:no breast pain or new or enlarging lumps on self exam, no vaginal bleeding. Denies breast tenderness, mass, discharge, changes in size or contour, or abnormal cyclic discomfort. No neurological complaints.  Past Medical History:   Diagnosis Date   • Menopause, premature 2012   • Menorrhagia 2011   • GERD (gastroesophageal reflux disease) 2010   • Unspecified vitamin D deficiency 2010   • Hypoglycemia 2010   • Fatigue 2010   • Primary snoring 2010   • Lactose intolerance 2010   • Nocturnal muscle spasm 2010   • Irregular menses 2010   • Hypertension      None  Allergy:   Pcn [penicillins]    LMP:       No LMP recorded. Patient is not currently having periods (Reason: Irregular Menses). .     Menstrual History: postmenopausal      Pap history, the patient denies abnormal Pap smears and denies   sexually transmitted diseases    Mammo:recent    Objective : The patient appears well, alert and oriented x 3, in no acute distress.  Blood pressure 120/76, height 1.575 m (5' 2\"), weight 95.3 kg (210 lb), not currently breastfeeding.  HEENT Exam: EOMI, REBEKAH, no adenopathy or thyromegaly.  Lungs: Clear to Auscultation   Cor: S1 and S2 normal, no murmurs, or rubs   Abdomen: Soft without tenderness, guarding mass or organomegaly.  Extremities: No edema, pulses equal  Neurological: Normal No focal signs  Breast Exam:Inspection negative. No nipple discharge or bleeding. No " masses or nodularity palpable  Pelvic: negative findings: external genitalia normal, Bartholin's glands, urethra, Nightmute's glands negative, vaginal mucosa normal, cervix clear, normal sized uterus, adnexae negative  Very minimal descensus and a small cystocele, no obvious reason for patient's symptoms    Lab:No results found for this or any previous visit (from the past 336 hour(s)).    Assessment:  well woman    Plan:pap smear  return annually or prn    Contraception:none

## 2017-10-11 LAB
CYTOLOGY REG CYTOL: NORMAL
HPV HR 12 DNA CVX QL NAA+PROBE: NEGATIVE
HPV16 DNA SPEC QL NAA+PROBE: NEGATIVE
HPV18 DNA SPEC QL NAA+PROBE: NEGATIVE
SPECIMEN SOURCE: NORMAL

## 2017-10-21 DIAGNOSIS — R07.89 CHEST PRESSURE: ICD-10-CM

## 2017-10-23 RX ORDER — DICLOFENAC SODIUM 75 MG/1
TABLET, DELAYED RELEASE ORAL
Qty: 30 TAB | Refills: 0 | Status: SHIPPED | OUTPATIENT
Start: 2017-10-23 | End: 2017-11-09 | Stop reason: SDUPTHER

## 2017-11-09 ENCOUNTER — OFFICE VISIT (OUTPATIENT)
Dept: MEDICAL GROUP | Facility: PHYSICIAN GROUP | Age: 52
End: 2017-11-09
Payer: COMMERCIAL

## 2017-11-09 VITALS
BODY MASS INDEX: 38.46 KG/M2 | RESPIRATION RATE: 16 BRPM | TEMPERATURE: 97.6 F | SYSTOLIC BLOOD PRESSURE: 124 MMHG | DIASTOLIC BLOOD PRESSURE: 74 MMHG | HEIGHT: 62 IN | OXYGEN SATURATION: 97 % | WEIGHT: 209 LBS | HEART RATE: 78 BPM

## 2017-11-09 DIAGNOSIS — R07.89 CHEST PRESSURE: ICD-10-CM

## 2017-11-09 DIAGNOSIS — J04.0 ACUTE LARYNGITIS: ICD-10-CM

## 2017-11-09 DIAGNOSIS — J02.9 PHARYNGITIS, UNSPECIFIED ETIOLOGY: ICD-10-CM

## 2017-11-09 DIAGNOSIS — R05.9 COUGH: ICD-10-CM

## 2017-11-09 DIAGNOSIS — J20.9 ACUTE BRONCHITIS, UNSPECIFIED ORGANISM: ICD-10-CM

## 2017-11-09 DIAGNOSIS — R19.5 DARK STOOLS: ICD-10-CM

## 2017-11-09 LAB
INT CON NEG: NEGATIVE
INT CON POS: POSITIVE
S PYO AG THROAT QL: NORMAL

## 2017-11-09 PROCEDURE — 99214 OFFICE O/P EST MOD 30 MIN: CPT | Performed by: FAMILY MEDICINE

## 2017-11-09 PROCEDURE — 87880 STREP A ASSAY W/OPTIC: CPT | Performed by: FAMILY MEDICINE

## 2017-11-09 RX ORDER — ALBUTEROL SULFATE 90 UG/1
2 AEROSOL, METERED RESPIRATORY (INHALATION) EVERY 6 HOURS PRN
Qty: 8.5 G | Refills: 1 | Status: SHIPPED | OUTPATIENT
Start: 2017-11-09 | End: 2021-05-18

## 2017-11-09 RX ORDER — DICLOFENAC SODIUM 75 MG/1
75 TABLET, DELAYED RELEASE ORAL 2 TIMES DAILY PRN
Qty: 30 TAB | Refills: 3 | Status: SHIPPED | OUTPATIENT
Start: 2017-11-09 | End: 2018-01-02 | Stop reason: SDUPTHER

## 2017-11-09 RX ORDER — CYCLOBENZAPRINE HCL 5 MG
5 TABLET ORAL 2 TIMES DAILY PRN
Qty: 30 TAB | Refills: 3 | Status: SHIPPED | OUTPATIENT
Start: 2017-11-09 | End: 2018-01-02 | Stop reason: SDUPTHER

## 2017-11-09 RX ORDER — CODEINE PHOSPHATE AND GUAIFENESIN 10; 100 MG/5ML; MG/5ML
5 SOLUTION ORAL EVERY 4 HOURS PRN
Qty: 200 ML | Refills: 0 | Status: SHIPPED | OUTPATIENT
Start: 2017-11-09 | End: 2017-11-21 | Stop reason: SDUPTHER

## 2017-11-09 ASSESSMENT — PATIENT HEALTH QUESTIONNAIRE - PHQ9: CLINICAL INTERPRETATION OF PHQ2 SCORE: 0

## 2017-11-09 NOTE — PATIENT INSTRUCTIONS
Acute Bronchitis  Bronchitis is when the airways that extend from the windpipe into the lungs get red, puffy, and painful (inflamed). Bronchitis often causes thick spit (mucus) to develop. This leads to a cough. A cough is the most common symptom of bronchitis.  In acute bronchitis, the condition usually begins suddenly and goes away over time (usually in 2 weeks). Smoking, allergies, and asthma can make bronchitis worse. Repeated episodes of bronchitis may cause more lung problems.  HOME CARE  · Rest.  · Drink enough fluids to keep your pee (urine) clear or pale yellow (unless you need to limit fluids as told by your doctor).  · Only take over-the-counter or prescription medicines as told by your doctor.  · Avoid smoking and secondhand smoke. These can make bronchitis worse. If you are a smoker, think about using nicotine gum or skin patches. Quitting smoking will help your lungs heal faster.  · Reduce the chance of getting bronchitis again by:  ¨ Washing your hands often.  ¨ Avoiding people with cold symptoms.  ¨ Trying not to touch your hands to your mouth, nose, or eyes.  · Follow up with your doctor as told.  GET HELP IF:  Your symptoms do not improve after 1 week of treatment. Symptoms include:  · Cough.  · Fever.  · Coughing up thick spit.  · Body aches.  · Chest congestion.  · Chills.  · Shortness of breath.  · Sore throat.  GET HELP RIGHT AWAY IF:   · You have an increased fever.  · You have chills.  · You have severe shortness of breath.  · You have bloody thick spit (sputum).  · You throw up (vomit) often.  · You lose too much body fluid (dehydration).  · You have a severe headache.  · You faint.  MAKE SURE YOU:   · Understand these instructions.  · Will watch your condition.  · Will get help right away if you are not doing well or get worse.     This information is not intended to replace advice given to you by your health care provider. Make sure you discuss any questions you have with your health  care provider.     Document Released: 06/05/2009 Document Revised: 08/20/2014 Document Reviewed: 06/10/2014  [employer] Interactive Patient Education ©2016 [employer]    Pharyngitis  Pharyngitis is redness, pain, and swelling (inflammation) of your pharynx.   CAUSES   Pharyngitis is usually caused by infection. Most of the time, these infections are from viruses (viral) and are part of a cold. However, sometimes pharyngitis is caused by bacteria (bacterial). Pharyngitis can also be caused by allergies. Viral pharyngitis may be spread from person to person by coughing, sneezing, and personal items or utensils (cups, forks, spoons, toothbrushes). Bacterial pharyngitis may be spread from person to person by more intimate contact, such as kissing.   SIGNS AND SYMPTOMS   Symptoms of pharyngitis include:    · Sore throat.    · Tiredness (fatigue).    · Low-grade fever.    · Headache.  · Joint pain and muscle aches.  · Skin rashes.  · Swollen lymph nodes.  · Plaque-like film on throat or tonsils (often seen with bacterial pharyngitis).  DIAGNOSIS   Your health care provider will ask you questions about your illness and your symptoms. Your medical history, along with a physical exam, is often all that is needed to diagnose pharyngitis. Sometimes, a rapid strep test is done. Other lab tests may also be done, depending on the suspected cause.   TREATMENT   Viral pharyngitis will usually get better in 3-4 days without the use of medicine. Bacterial pharyngitis is treated with medicines that kill germs (antibiotics).   HOME CARE INSTRUCTIONS   · Drink enough water and fluids to keep your urine clear or pale yellow.    · Only take over-the-counter or prescription medicines as directed by your health care provider:    ¨ If you are prescribed antibiotics, make sure you finish them even if you start to feel better.    ¨ Do not take aspirin.    · Get lots of rest.    · Gargle with 8 oz of salt water (½ tsp of salt per 1 qt of  water) as often as every 1-2 hours to soothe your throat.    · Throat lozenges (if you are not at risk for choking) or sprays may be used to soothe your throat.  SEEK MEDICAL CARE IF:   · You have large, tender lumps in your neck.  · You have a rash.  · You cough up green, yellow-brown, or bloody spit.  SEEK IMMEDIATE MEDICAL CARE IF:   · Your neck becomes stiff.  · You drool or are unable to swallow liquids.  · You vomit or are unable to keep medicines or liquids down.  · You have severe pain that does not go away with the use of recommended medicines.  · You have trouble breathing (not caused by a stuffy nose).  MAKE SURE YOU:   · Understand these instructions.  · Will watch your condition.  · Will get help right away if you are not doing well or get worse.     This information is not intended to replace advice given to you by your health care provider. Make sure you discuss any questions you have with your health care provider.     Document Released: 12/18/2006 Document Revised: 10/08/2014 Document Reviewed: 08/25/2014  [employer] Interactive Patient Education ©2016 [employer]

## 2017-11-09 NOTE — ASSESSMENT & PLAN NOTE
Started 4 days ago when taking peptobismol. Today stool is normal.   Normal colonoscopy in the past.   Advised to monitor for any recurrence, if so will check FIT/colonosopy.

## 2017-11-09 NOTE — ASSESSMENT & PLAN NOTE
3 weeks ago patient had viral URI  Symptoms were getting better but now she has worsening cough, feeling dizzy and light headed. Also with starting of sore throat and tender LN  Daughter was treated for strep throat recently.   Patient had diarrhea and vomiting over the weekend and sometimes now her stomach feels bloated over past week.   Will check rapid strep and rx for cough med with codeine.   Advised against abx at this time.

## 2017-11-13 NOTE — PROGRESS NOTES
Subjective:   Larisa Davis is a 52 y.o. female here today for evaluation and management of:     Dark stools  Started 4 days ago when taking peptobismol. Today stool is normal.   Normal colonoscopy in the past.   Advised to monitor for any recurrence, if so will check FIT/colonosopy.       Pharyngitis  3 weeks ago patient had viral URI  Symptoms were getting better but now she has worsening cough, feeling dizzy and light headed. Also with starting of sore throat and tender LN  Daughter was treated for strep throat recently.   Patient had diarrhea and vomiting over the weekend and sometimes now her stomach feels bloated over past week.   Will check rapid strep and rx for cough med with codeine.   Advised against abx at this time.              Current medicines (including changes today)  Current Outpatient Prescriptions   Medication Sig Dispense Refill   • guaifenesin-codeine (CHERATUSSIN AC) Solution oral solution Take 5 mL by mouth every four hours as needed for Cough. 200 mL 0   • albuterol 108 (90 Base) MCG/ACT Aero Soln inhalation aerosol Inhale 2 Puffs by mouth every 6 hours as needed for Shortness of Breath. 8.5 g 1   • diclofenac EC (VOLTAREN) 75 MG Tablet Delayed Response Take 1 Tab by mouth 2 times a day as needed. PAIN 30 Tab 3   • cyclobenzaprine (FLEXERIL) 5 MG tablet Take 1 Tab by mouth 2 times a day as needed for Muscle Spasms. 30 Tab 3   • propranolol CR (INDERAL LA) 80 MG CAPSULE SR 24 HR Take 1 Cap by mouth every day. 90 Cap 3   • Multiple Vitamin (MULTI VITAMIN DAILY PO) Take  by mouth.     • Cholecalciferol (VITAMIN D PO) Take  by mouth.     • Magnesium 100 MG Cap Take  by mouth.     • Calcium Carbonate-Vitamin D (CALCIUM + D PO) Take  by mouth.     • Misc. Devices MISC New CPAP mask and supplies; dog chewed up mask; fax to [facility] 1 Device 3   • alprazolam (XANAX) 0.5 MG Tab Take 1 Tab by mouth 2 times a day as needed for Sleep or Anxiety. 30 Tab 1   • omeprazole (PRILOSEC) 40 MG  "delayed-release capsule TAKE 1 CAPSULE BY MOUTH DAILY 90 Cap 3   • fluticasone (FLONASE) 50 MCG/ACT nasal spray Spray 1 Spray in nose every day. 16 g 11     No current facility-administered medications for this visit.      She  has a past medical history of Fatigue (12/28/2010); GERD (gastroesophageal reflux disease) (12/28/2010); Hypertension; Hypoglycemia (12/28/2010); Irregular menses (12/28/2010); Lactose intolerance (12/28/2010); Menopause, premature (7/24/2012); Menorrhagia (1/18/2011); Nocturnal muscle spasm (12/28/2010); Primary snoring (12/28/2010); and Unspecified vitamin D deficiency (12/28/2010).    ROS  No chest pain, no shortness of breath, no abdominal pain       Objective:     Blood pressure 124/74, pulse 78, temperature 36.4 °C (97.6 °F), resp. rate 16, height 1.575 m (5' 2\"), weight 94.8 kg (209 lb), SpO2 97 %. Body mass index is 38.23 kg/m².   Physical Exam:  Constitutional: Alert, no distress.  Skin: Warm, dry, good turgor, no rashes in visible areas.  Eye: Equal, round and reactive, conjunctiva clear, lids normal.  ENMT: Lips without lesions, good dentition, oropharynx clear.  Neck: Trachea midline, no masses, no thyromegaly. No cervical or supraclavicular lymphadenopathy  Respiratory: Unlabored respiratory effort, lungs clear to auscultation, no wheezes, no ronchi.  Cardiovascular: Normal S1, S2, no murmur, no edema.  Abdomen: Soft, non-tender, no masses, no hepatosplenomegaly.  Psych: Alert and oriented x3, normal affect and mood.        Assessment and Plan:   The following treatment plan was discussed    1. Pharyngitis, unspecified etiology  Viral pharyngitis  - POCT Rapid Strep A is negative in clinic  Advised on cough medication, tylenol, ibuprofen hydration. Monitor for worsening symptoms.   - guaifenesin-codeine (CHERATUSSIN AC) Solution oral solution; Take 5 mL by mouth every four hours as needed for Cough.  Dispense: 200 mL; Refill: 0    2. Dark stools  Due to peptobismol. Resolved. " Monitor. Check FIT/refer to GI for colonoscopy if they occur again.     3. Acute bronchitis, unspecified organism  Advised on supportive management.   Advised on cough medication, tylenol, ibuprofen hydration. Monitor for worsening symptoms.       Followup: No Follow-up on file.

## 2017-11-21 DIAGNOSIS — J02.9 PHARYNGITIS, UNSPECIFIED ETIOLOGY: ICD-10-CM

## 2017-11-22 RX ORDER — CODEINE PHOSPHATE AND GUAIFENESIN 10; 100 MG/5ML; MG/5ML
LIQUID ORAL
Qty: 200 ML | Refills: 0 | Status: SHIPPED
Start: 2017-11-22 | End: 2019-07-02

## 2017-12-13 RX ORDER — AZITHROMYCIN 250 MG/1
TABLET, FILM COATED ORAL
Qty: 6 TAB | Refills: 0 | Status: SHIPPED | OUTPATIENT
Start: 2017-12-13 | End: 2019-03-08

## 2017-12-19 ENCOUNTER — OCCUPATIONAL MEDICINE (OUTPATIENT)
Dept: URGENT CARE | Facility: PHYSICIAN GROUP | Age: 52
End: 2017-12-19
Payer: COMMERCIAL

## 2017-12-19 VITALS
SYSTOLIC BLOOD PRESSURE: 132 MMHG | TEMPERATURE: 97.9 F | HEART RATE: 88 BPM | WEIGHT: 207 LBS | HEIGHT: 62 IN | OXYGEN SATURATION: 95 % | RESPIRATION RATE: 14 BRPM | BODY MASS INDEX: 38.09 KG/M2 | DIASTOLIC BLOOD PRESSURE: 94 MMHG

## 2017-12-19 DIAGNOSIS — S63.502A SPRAIN OF LEFT WRIST, INITIAL ENCOUNTER: ICD-10-CM

## 2017-12-19 DIAGNOSIS — S13.9XXA NECK SPRAIN, INITIAL ENCOUNTER: ICD-10-CM

## 2017-12-19 DIAGNOSIS — S23.9XXA THORACIC BACK SPRAIN, INITIAL ENCOUNTER: ICD-10-CM

## 2017-12-19 DIAGNOSIS — S33.5XXA LUMBAR SPRAIN, INITIAL ENCOUNTER: ICD-10-CM

## 2017-12-19 DIAGNOSIS — S63.501A SPRAIN OF RIGHT WRIST, INITIAL ENCOUNTER: ICD-10-CM

## 2017-12-19 LAB
AMP AMPHETAMINE: NORMAL
BREATH ALCOHOL COMMENT: NORMAL
COC COCAINE: NORMAL
INT CON NEG: NEGATIVE
INT CON POS: POSITIVE
MET METHAMPHETAMINES: NORMAL
OPI OPIATES: NORMAL
PCP PHENCYCLIDINE: NORMAL
POC BREATHALIZER: 0 PERCENT (ref 0–0.01)
POC DRUG COMMENT 753798-OCCUPATIONAL HEALTH: NEGATIVE
THC: NORMAL

## 2017-12-19 PROCEDURE — 99214 OFFICE O/P EST MOD 30 MIN: CPT | Performed by: FAMILY MEDICINE

## 2017-12-19 PROCEDURE — 80305 DRUG TEST PRSMV DIR OPT OBS: CPT | Performed by: FAMILY MEDICINE

## 2017-12-19 PROCEDURE — 82075 ASSAY OF BREATH ETHANOL: CPT | Performed by: FAMILY MEDICINE

## 2017-12-19 NOTE — PROGRESS NOTES
Chief Complaint:    Chief Complaint   Patient presents with   • Back Injury       History of Present Illness:    DOI: 12/18/17. 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 She sat on a stool, one of the legs broke, collapsing the seat. She feels pain in her back of neck (causing headache), mid-lower back, hips, wrists, and hands. Wants to see Chiropractor for treatment as this has helped in past. She spoke to the  at her school and was told to come here to get referral to Chiropractor. She has Diclofenac 75 mg twice a day as needed to take for anti-inflammatory and Cyclobenzaprine 5 mg twice a day as needed to take for muscle relaxer, but did not take either yesterday or today. Takes these meds for her back in the past. Occasionally back hurts and takes these meds if needed and sees Chiropractor if needed. These treatments usually resolve exacerbation. No 2nd job or other outside activity to have contributed to current symptoms.      Review of Systems:    Constitutional: Negative for fever, chills, and diaphoresis.   Eyes: Negative for change in vision, photophobia, pain, redness, and discharge.  ENT: Negative for ear pain, ear discharge, hearing loss, tinnitus, nasal congestion, nosebleeds, and sore throat.    Respiratory: Negative for cough, hemoptysis, sputum production, shortness of breath, wheezing, and stridor.    Cardiovascular: Negative for chest pain, palpitations, orthopnea, claudication, leg swelling, and PND.   Gastrointestinal: Negative for abdominal pain, nausea, vomiting, diarrhea, constipation, blood in stool, and melena.   Genitourinary: Negative for dysuria, urinary urgency, urinary frequency, hematuria, and flank pain.   Musculoskeletal: See HPI.  Skin: Negative for rash and itching.   Neurological: Negative for dizziness, tingling, tremors, sensory change, speech change, focal weakness, seizures, and loss of consciousness.  Endo: Negative for polydipsia.   Heme: Does not  bruise/bleed easily.   Psychiatric/Behavioral: History of anxiety, uses Alprazolam prn.      Past Medical History:    Past Medical History:   Diagnosis Date   • Fatigue 12/28/2010   • GERD (gastroesophageal reflux disease) 12/28/2010   • Hypertension    • Hypoglycemia 12/28/2010   • Irregular menses 12/28/2010   • Lactose intolerance 12/28/2010   • Menopause, premature 7/24/2012   • Menorrhagia 1/18/2011   • Nocturnal muscle spasm 12/28/2010   • Primary snoring 12/28/2010   • Unspecified vitamin D deficiency 12/28/2010       Past Surgical History:    History reviewed. No pertinent surgical history.    Social History:    Social History     Social History   • Marital status:      Spouse name: N/A   • Number of children: N/A   • Years of education: N/A     Occupational History   • Not on file.     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     Other Topics Concern   • Exercise No   • Bike Helmet No   • Seat Belt Yes   • Self-Exams Yes     Social History Narrative   • No narrative on file       Family History:    Family History   Problem Relation Age of Onset   • Non-contributory Mother      retinal detachment   • Cancer Maternal Grandfather 92     colon cancer       Medications:    Current Outpatient Prescriptions on File Prior to Visit   Medication Sig Dispense Refill   • azithromycin (ZITHROMAX) 250 MG Tab Take two tablets day one then one tablet daily for days 4-5 6 Tab 0   • VIRTUSSIN A/C Solution oral solution TAKE 5 ML BY MOUTH EVERY 4 HOURS AS NEEDED FOR COUGH 200 mL 0   • albuterol 108 (90 Base) MCG/ACT Aero Soln inhalation aerosol Inhale 2 Puffs by mouth every 6  "hours as needed for Shortness of Breath. 8.5 g 1   • diclofenac EC (VOLTAREN) 75 MG Tablet Delayed Response Take 1 Tab by mouth 2 times a day as needed. PAIN 30 Tab 3   • cyclobenzaprine (FLEXERIL) 5 MG tablet Take 1 Tab by mouth 2 times a day as needed for Muscle Spasms. 30 Tab 3   • propranolol CR (INDERAL LA) 80 MG CAPSULE SR 24 HR Take 1 Cap by mouth every day. 90 Cap 3   • alprazolam (XANAX) 0.5 MG Tab Take 1 Tab by mouth 2 times a day as needed for Sleep or Anxiety. 30 Tab 1   • omeprazole (PRILOSEC) 40 MG delayed-release capsule TAKE 1 CAPSULE BY MOUTH DAILY 90 Cap 3   • fluticasone (FLONASE) 50 MCG/ACT nasal spray Spray 1 Spray in nose every day. 16 g 11   • Multiple Vitamin (MULTI VITAMIN DAILY PO) Take  by mouth.     • Cholecalciferol (VITAMIN D PO) Take  by mouth.     • Magnesium 100 MG Cap Take  by mouth.     • Calcium Carbonate-Vitamin D (CALCIUM + D PO) Take  by mouth.     • Misc. Devices MISC New CPAP mask and supplies; dog chewed up mask; fax to [employer] 1 Device 3     No current facility-administered medications on file prior to visit.        Allergies:    Allergies   Allergen Reactions   • Pcn [Penicillins] Rash       Vitals:    Vitals:    12/19/17 1554   BP: 132/94   Pulse: 88   Resp: 14   Temp: 36.6 °C (97.9 °F)   SpO2: 95%   Weight: 93.9 kg (207 lb)   Height: 1.575 m (5' 2\")       Physical Exam:    Constitutional: Vital signs reviewed. Appears well-developed and well-nourished. No acute distress.   Eyes: Sclera white, conjunctivae clear.  ENT: External ears normal. Hearing normal.  Pulmonary/Chest: Respirations non-labored.  Musculoskeletal: Able to essentially do full range of motion but they exacerbate pain in neck and back. Normal gait. Mild tenderness to palpation right lower back region. No muscular atrophy or weakness.  Neurological: Alert and oriented to person, place, and time. Muscle tone normal. Coordination normal. Light touch and sensation normal.   Skin: No rashes or lesions. Warm, " dry, normal turgor.  Psychiatric: Normal mood and affect. Behavior is normal. Judgment and thought content normal.       Assessment / Plan:    1. Thoracic back sprain, initial encounter  - REFERRAL TO CHIROPRACTIC    2. Lumbar sprain, initial encounter  - REFERRAL TO CHIROPRACTIC    3. Neck sprain, initial encounter  - REFERRAL TO CHIROPRACTIC    4. Sprain of right wrist, initial encounter  - REFERRAL TO CHIROPRACTIC    5. Sprain of left wrist, initial encounter  - REFERRAL TO CHIROPRACTIC      Full duty.    Ordered Chiropractor referral at her request as Chiropractor has helped her neck and back in past and she wants to go.    She has Diclofenac 75 mg twice a day as needed to take for anti-inflammatory and Cyclobenzaprine 5 mg twice a day as needed to take for muscle relaxer.    Referred to Chiropractor. Return here if needed.

## 2017-12-19 NOTE — LETTER
"EMPLOYEE’S CLAIM FOR COMPENSATION/ REPORT OF INITIAL TREATMENT  FORM C-4    EMPLOYEE’S CLAIM - PROVIDE ALL INFORMATION REQUESTED   First Name  Larisa Last Name  Ryan Birthdate                    1965                Sex  female Claim Number   Home Address  [address] Age  52 y.o. 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 (if applicable)  SITTING    How did this injury or occupational disease occur? (Be specific an answer in detail. Use additional sheet if necessary)  CHAIR COLLASPED   If you believe that you have an occupational disease, when did you first have knowledge of the disability and it relationship to your employment?   Witnesses to the Accident  MY CLASS      Nature of Injury or Occupational Disease  Workers' Compensation  Part(s) of Body Injured or Affected  Upper Back Area (Thoracic Area), Wrist (R) and Hand (R), Wrist (R) and Hand (R)    I certify that the above is true and correct to the best of my knowledge and that I have provided this information in order to obtain the benefits of [place]’s " [place]).  I hereby authorize any physician, chiropractor, surgeon, practitioner, or other person, any hospital, including [facility], any medical service organization, any insurance company, or other institution or organization to release to each other, any medical or other information, including benefits paid or payable, pertinent to this injury or disease, except information relative to diagnosis, treatment and/or counseling for AIDS, psychological conditions, alcohol or controlled substances, for which I must give specific authorization.  A Photostat of this authorization shall be as valid as the original.     Date 19DEC17   [facility] [[email or web address]]   Employee’s Signature   THIS REPORT MUST BE COMPLETED AND MAILED WITHIN 3 WORKING DAYS OF TREATMENT   [facility]  Name of Facility  [place]   Date  12/19/2017 Diagnosis  (S23.9XXA) Thoracic back sprain, initial encounter  (S33.5XXA) Lumbar sprain, initial encounter  (S13.9XXA) Neck sprain, initial encounter  (S63.501A) Sprain of right wrist, initial encounter  (S63.502A) Sprain of left wrist, initial encounter Is there evidence the injured employee was under the influence of alcohol and/or another controlled substance at the time of accident?   Hour  3:35 PM Description of Injury or Disease  Diagnoses of Thoracic back sprain, initial encounter, Lumbar sprain, initial encounter, Neck sprain, initial encounter, Sprain of right wrist, initial encounter, and Sprain of left wrist, initial encounter were pertinent to this visit. No   Treatment  (1) Ordered Chiropractor referral at her request as Chiropractor has helped her neck and back in past and she wants to go.  (2) She has Diclofenac 75 mg twice a day as needed to take for anti-inflammatory and Cyclobenzaprine 5 mg twice a day as needed to take for muscle  "relaxer.  Have you advised the patient to remain off work five days or more? No   X-Ray Findings      If Yes   From Date  To Date      From information given by the employee, together with medical evidence, can you directly connect this injury or occupational disease as job incurred?  Yes If No Full Duty  Yes Modified Duty     Is additional medical care by a physician indicated?  Yes  Comments:Referred to Chiropractor. Return here if needed. If Modified Duty, Specify any Limitations / Restrictions      Do you know of any previous injury or disease contributing to this condition or occupational disease?                            Yes  Comments:Occasionally back hurts and takes Diclofenac and Cyclobenzaprine if needed and sees Chiropractor if needed. These treatments usually resolve exacerbation.   Date  12/19/2017 Print Doctor’s Name Rico Ramirez M.D. I certify the employer’s copy of  this form was mailed on:   Address  [address] Insurer’s Use Only     [facility] [place]  [address]    Provider’s Tax ID Number  [id number] Telephone  Dept: [phone]        timbo-RICO Calhoun M.D.   e-Signature: Dr. Jeff Sahu, Medical Director Degree  MD        ORIGINAL-TREATING PHYSICIAN OR CHIROPRACTOR    PAGE 2-INSURER/TPA    PAGE 3-EMPLOYER    PAGE 4-EMPLOYEE             Form C-4 (rev10/07)              BRIEF DESCRIPTION OF RIGHTS AND BENEFITS  (Pursuant to [place]RS 616C.050)    Notice of Injury or Occupational Disease (Incident Report Form C-1): If an injury or occupational disease (OD) arises out of and in the  course of employment, you must provide written notice to your employer as soon as practicable, but no later than 7 days after the accident or  OD. Your employer shall maintain a sufficient supply of the required forms.    Claim for Compensation (Form C-4): If medical treatment is sought, the form C-4 is available at the place of initial treatment. A completed  \"Claim for Compensation\" (Form " C-4) must be filed within 90 days after an accident or OD. The treating physician or chiropractor must,  within 3 working days after treatment, complete and mail to the employer, the employer's insurer and third-party , the Claim for  Compensation.    Medical Treatment: If you require medical treatment for your on-the-job injury or OD, you may be required to select a physician or  chiropractor from a list provided by your workers’ compensation insurer, if it has contracted with an Organization for Managed Care (MCO) or  Preferred Provider Organization (PPO) or providers of health care. If your employer has not entered into a contract with an MCO or PPO, you  may select a physician or chiropractor from the Panel of Physicians and Chiropractors. Any medical costs related to your industrial injury or  OD will be paid by your insurer.    Temporary Total Disability (TTD): If your doctor has certified that you are unable to work for a period of at least 5 consecutive days, or 5  cumulative days in a 20-day period, or places restrictions on you that your employer does not accommodate, you may be entitled to TTD  compensation.    Temporary Partial Disability (TPD): If the wage you receive upon reemployment is less than the compensation for TTD to which you are  entitled, the insurer may be required to pay you TPD compensation to make up the difference. TPD can only be paid for a maximum of 24  months.    Permanent Partial Disability (PPD): When your medical condition is stable and there is an indication of a PPD as a result of your injury or  OD, within 30 days, your insurer must arrange for an evaluation by a rating physician or chiropractor to determine the degree of your PPD. The  amount of your PPD award depends on the date of injury, the results of the PPD evaluation and your age and wage.    Permanent Total Disability (PTD): If you are medically certified by a treating physician or chiropractor as  permanently and totally disabled  and have been granted a PTD status by your insurer, you are entitled to receive monthly benefits not to exceed 66 2/3% of your average  monthly wage. The amount of your PTD payments is subject to reduction if you previously received a PPD award.    Vocational Rehabilitation Services: You may be eligible for vocational rehabilitation services if you are unable to return to the job due to a  permanent physical impairment or permanent restrictions as a result of your injury or occupational disease.    Transportation and Per Giselle Reimbursement: You may be eligible for travel expenses and per giselle associated with medical treatment.    Reopening: You may be able to reopen your claim if your condition worsens after claim closure.    Appeal Process: If you disagree with a written determination issued by the insurer or the insurer does not respond to your request, you may  appeal to the Department of Administration, , by following the instructions contained in your determination letter. You must  appeal the determination within 70 days from the date of the determination letter at [address], [address], [place]  [address], or [address], [place] [address]. If you disagree with the  decision, you may appeal to the  Department of Administration, . You must file your appeal within 30 days from the date of the  decision  letter at [address], [address], [place], [place] [address], or [address], [place], [place] [address]. If you  disagree with a decision of an , you may file a petition for judicial review with the District Court. You must do so within 30  days of the Appeal Officer’s decision. You may be represented by an  at your own expense or you may contact the [facility] for possible  representation.    [place]  for Injured Workers  (NAIW): If you disagree with a  decision, you may request that NAIW represent you  without charge at an  Hearing. For information regarding denial of benefits, you may contact the NA at: [address], [address], [place] [address], [phone], or [address], [place] [address], [phone]    To File a Complaint with the Division: If you wish to file a complaint with the  of the Division of Industrial Relations (DIR),  please contact the Workers’ Compensation Section, [address], [address], [place], [place] [address], telephone [phone], or  [address], [address], [place] [address], telephone [phone].    For assistance with Workers’ Compensation Issues: you may contact the Office of the Governor Consumer Health Assistance, [address] [place] [address], [address], [place], [place] [address], Toll Free 1-731.908.9959, Web site: http://govcha.[facility].nv., E-mail  Gwen@govcha.[facility].nv.                                                                                                                                                                                                                                   __________________________________________________________________                                                                   __19DEC17____                Employee Name / Signature                                                                                                                                                       Date                                                                                                                                                                                                     D-2 (rev. 10/07)

## 2017-12-19 NOTE — LETTER
[facility] [place] [address]  Phone:  [phone] - Fax:  [phone]   [facility] Progress Report and Disability Certification  Date of Service: 12/19/2017   No Show:  No  Date / Time of Next Visit:     Claim Information   Patient Name: Larisa Davis  Claim Number:     Employer: [employer]  Date of Injury: 12/19/2017     Insurer / TPA:  ID / SSN:     Occupation: TEACHER  Diagnosis: Diagnoses of Thoracic back sprain, initial encounter, Lumbar sprain, initial encounter, Neck sprain, initial encounter, Sprain of right wrist, initial encounter, and Sprain of left wrist, initial encounter were pertinent to this visit.    Medical Information   Related to Industrial Injury? 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 She feels pain in her back of neck (causing headache), mid-lower back, hips, wrists, and hands. Wants to see Chiropractor for treatment as this has helped in past. She spoke to the  at her school and was told to come here to get referral to Chiropractor. She has Diclofenac 75 mg twice a day as needed to take for anti-inflammatory and Cyclobenzaprine 5 mg twice a day as needed to take for muscle relaxer, but did not take either yesterday or today. Takes these meds for her back in the past. Occasionally back hurts and takes these meds if needed and sees Chiropractor if needed. These treatments usually resolve exacerbation. No 2nd job or other outside activity to have contributed to current symptoms.   Objective Findings: Able to essentially do full range of motion but they exacerbate pain in neck and back.   Pre-Existing Condition(s): Occasionally back hurts and takes Diclofenac and Cyclobenzaprine if needed and sees Chiropractor if needed. These treatments usually resolve exacerbation.      Assessment:   Initial Visit    Status: Additional Care Required  Comments:Referred to Chiropractor. Return here if needed.  Permanent Disability:No    Plan: MedicationConsultation  Comments:See below.    Diagnostics:      Comments:  She has Diclofenac 75 mg twice a day as needed to take for anti-inflammatory and Cyclobenzaprine 5 mg twice a day as needed to take for muscle relaxer.  Ordered Chiropractor referral at her request as Chiropractor has helped her neck and back in past   and she wants to go.    Disability Information   Status: Released to Full Duty    From:    Through:   Restrictions are:    Physical Restrictions   Sitting:    Standing:    Stooping:    Bending:      Squatting:    Walking:    Climbing:    Pushing:      Pulling:    Other:    Reaching Above Shoulder (L):   Reaching Above Shoulder (R):       Reaching Below Shoulder (L):    Reaching Below Shoulder (R):      Not to exceed Weight Limits   Carrying(hrs):   Weight Limit(lb):   Lifting(hrs):   Weight  Limit(lb):     Comments:      Repetitive Actions   Hands: i.e. Fine Manipulations from Grasping:     Feet: i.e. Operating Foot Controls:     Driving / Operate Machinery:     Physician Name: Rico Ramirez M.D. Physician Signature: RICO Gibson M.D. e-Signature: Dr. Jeff Sahu, Medical Director   Clinic Name / Location: [address] [address] Clinic Phone Number: Dept: [phone]   Appointment Time: 3:25 Pm Visit Start Time: 3:35 PM   Check-In Time:  3:31 Pm Visit Discharge Time:  4:24 PM   Original-Treating Physician or Chiropractor    Page 2-Insurer/TPA    Page 3-Employer    Page 4-Employee

## 2018-01-02 ENCOUNTER — OCCUPATIONAL MEDICINE (OUTPATIENT)
Dept: OCCUPATIONAL MEDICINE | Facility: CLINIC | Age: 53
End: 2018-01-02
Payer: COMMERCIAL

## 2018-01-02 VITALS
WEIGHT: 204 LBS | OXYGEN SATURATION: 98 % | BODY MASS INDEX: 37.54 KG/M2 | DIASTOLIC BLOOD PRESSURE: 86 MMHG | HEART RATE: 83 BPM | RESPIRATION RATE: 16 BRPM | SYSTOLIC BLOOD PRESSURE: 118 MMHG | TEMPERATURE: 98.2 F | HEIGHT: 62 IN

## 2018-01-02 DIAGNOSIS — S39.012D STRAIN OF LUMBAR REGION, SUBSEQUENT ENCOUNTER: ICD-10-CM

## 2018-01-02 DIAGNOSIS — S16.1XXD CERVICAL STRAIN, SUBSEQUENT ENCOUNTER: ICD-10-CM

## 2018-01-02 DIAGNOSIS — S66.911D STRAIN OF RIGHT WRIST, SUBSEQUENT ENCOUNTER: ICD-10-CM

## 2018-01-02 PROCEDURE — 99204 OFFICE O/P NEW MOD 45 MIN: CPT | Performed by: PREVENTIVE MEDICINE

## 2018-01-02 RX ORDER — CYCLOBENZAPRINE HCL 5 MG
5 TABLET ORAL 2 TIMES DAILY PRN
Qty: 30 TAB | Refills: 0 | Status: SHIPPED | OUTPATIENT
Start: 2018-01-02 | End: 2018-01-30 | Stop reason: SDUPTHER

## 2018-01-02 RX ORDER — DICLOFENAC SODIUM 75 MG/1
75 TABLET, DELAYED RELEASE ORAL 2 TIMES DAILY PRN
Qty: 60 TAB | Refills: 0 | Status: SHIPPED | OUTPATIENT
Start: 2018-01-02 | End: 2018-01-30 | Stop reason: SDUPTHER

## 2018-01-02 ASSESSMENT — PAIN SCALES - GENERAL: PAINLEVEL: 6=MODERATE PAIN

## 2018-01-02 NOTE — LETTER
[address],   [address] [place] [address]  Phone:  [phone] - Fax:  [phone]   Occupational Health [facility] Progress Report and Disability Certification  Date of Service: 1/2/2018   No Show:  No  Date / Time of Next Visit: 1/30/2018   Claim Information   Patient Name: Larisa Davis  Claim Number:     Employer: [employer]  Date of Injury: 12/19/2017     Insurer / TPA: Anastasiyasi  ID / SSN:     Occupation: TEACHER  Diagnosis: Diagnoses of Strain of lumbar region, subsequent encounter and Strain of right wrist, subsequent encounter were pertinent to this visit.    Medical Information   Related to Industrial Injury? 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 She sat on a stool, one of the legs broke, collapsing the seat. She feels pain in her back of neck (causing headache), mid-lower back, hips, wrists, and hands. Seen in [place] [id number]. Patient states that lower back pain continues be about the same. She states she sought out her chiropractor on a private basis one time which helped a little bit. However she continues to have pain bilateral lumbar without radiating pain. Pain is worse with prolonged standing and walking and bending. Patient states she is taking diclofenac and Flexeril which she has for a right hip injury. 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 She also was continued pain of the right wrist. Pain is mostly on the ulnar aspect mildly tender and somewhat painful with certain movements.   Objective Findings: Lumbar: No gross deformity. Diffuse tenderness palpation paraspinal musculature from lower thoracic to lower lumbar. Reflexes intact. Sensation intact. Strength intact. Range of motion slightly decreased in flexion with some pain. Normal gait. Able to heel/toe walk.  Right wrist: No gross deformity. Slight tenderness over ulnar wrist.  Full range of motion. Strength intact. Finkelstein's negative. Tinel's negative. Phalen's negative.   Pre-Existing Condition(s):     Assessment:   Condition Same    Status: Additional Care Required  Permanent Disability:No    Plan:      Diagnostics:      Comments:  Referral to physical therapy  Prescribed diclofenac and Flexeril  Continue full duty  Follow-up one month    Disability Information   Status: Released to Full Duty    From:  1/2/2018  Through: 1/30/2018 Restrictions are:     Physical Restrictions   Sitting:    Standing:    Stooping:    Bending:      Squatting:    Walking:    Climbing:    Pushing:      Pulling:    Other:    Reaching Above Shoulder (L):   Reaching Above Shoulder (R):       Reaching Below Shoulder (L):    Reaching Below Shoulder (R):      Not to exceed Weight Limits   Carrying(hrs):   Weight Limit(lb):   Lifting(hrs):   Weight  Limit(lb):     Comments:      Repetitive Actions   Hands: i.e. Fine Manipulations from Grasping:     Feet: i.e. Operating Foot Controls:     Driving / Operate Machinery:     Physician Name: Eamon Caceres D.O. Physician Signature: EAMON Stevens D.O. e-Signature: Dr. Jeff Sahu, Medical Director   Clinic Name / Location: [address],   [address] [place] [address] Clinic Phone Number: Dept: [phone]   Appointment Time: 9:30 Am Visit Start Time: 9:11 AM   Check-In Time:  9:08 Am Visit Discharge Time:  9:53 AM   Original-Treating Physician or Chiropractor    Page 2-Insurer/TPA    Page 3-Employer    Page 4-Employee

## 2018-01-02 NOTE — PROGRESS NOTES
"Subjective:      Larisa Davis is a 53 y.o. female who presents with Follow-Up ([facility] DOI - 12/19/17 - Back pain- Feeling same -ROOM 3)      DOI: 12/18/17. 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 She feels pain in her back of neck (causing headache), mid-lower back, hips, wrists, and hands. Seen in [place] [facility]. Patient states that lower back pain continues be about the same. She states she sought out her chiropractor on a private basis one time which helped a little bit. However she continues to have pain bilateral lumbar without radiating pain. Pain is worse with prolonged standing and walking and bending. Patient states she is taking diclofenac and Flexeril which she has for a right hip injury. 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 She also was continued pain of the right wrist. Pain is mostly on the ulnar aspect mildly tender and somewhat painful with certain movements.     HPI    ROS  ROS: All systems were reviewed on intake form, form was reviewed and signed. See scanned documents in media. Pertinent positives and negatives included in HPI.    PMH: No pertinent past medical history to this problem  MEDS: Medications were reviewed in Epic  ALLERGIES:   Allergies   Allergen Reactions   • Pcn [Penicillins] Rash     SOCHX: Works as a teacher at [employer]   FH: No pertinent family history to this problem     Objective:     /86   Pulse 83   Temp 36.8 °C (98.2 °F)   Resp 16   Ht 1.575 m (5' 2\")   Wt 92.5 kg (204 lb)   SpO2 98%   BMI 37.31 kg/m²      Physical Exam   Constitutional: She is oriented to person, place, and time. She appears well-developed and well-nourished.   HENT:   Right Ear: External ear normal.   Left Ear: External ear normal.   Eyes: Conjunctivae and EOM are normal.   Cardiovascular: Normal rate.    Pulmonary/Chest: Effort normal. No respiratory " distress.   Neurological: She is alert and oriented to person, place, and time.   Skin: Skin is warm and dry.   Psychiatric: She has a normal mood and affect. Her behavior is normal. Judgment normal.       Lumbar: No gross deformity. Diffuse tenderness palpation paraspinal musculature from lower thoracic to lower lumbar. Reflexes intact. Sensation intact. Strength intact. Range of motion slightly decreased in flexion with some pain. Normal gait. Able to heel/toe walk.  Right wrist: No gross deformity. Slight tenderness over ulnar wrist. Full range of motion. Strength intact. Finkelstein's negative. Tinel's negative. Phalen's negative.       Assessment/Plan:     1. Strain of lumbar region, subsequent encounter  - REFERRAL TO PHYSICAL THERAPY Reason for Therapy: Eval/Treat/Report  - diclofenac EC (VOLTAREN) 75 MG Tablet Delayed Response; Take 1 Tab by mouth 2 times a day as needed. PAIN  Dispense: 60 Tab; Refill: 0  - cyclobenzaprine (FLEXERIL) 5 MG tablet; Take 1 Tab by mouth 2 times a day as needed for Muscle Spasms.  Dispense: 30 Tab; Refill: 0    2. Strain of right wrist, subsequent encounter  - REFERRAL TO PHYSICAL THERAPY Reason for Therapy: Eval/Treat/Report  - diclofenac EC (VOLTAREN) 75 MG Tablet Delayed Response; Take 1 Tab by mouth 2 times a day as needed. PAIN  Dispense: 60 Tab; Refill: 0  - cyclobenzaprine (FLEXERIL) 5 MG tablet; Take 1 Tab by mouth 2 times a day as needed for Muscle Spasms.  Dispense: 30 Tab; Refill: 0    Referral to physical therapy  Prescribed diclofenac and Flexeril  Continue full duty  Follow-up one month    Received note from patient requesting the cervical be added to note. Upon review she did note neck pain since the incident, but was not included as a diagnosis but should have been, a new PT referral was submitted the diagnosis of cervical strain, subsequent encounter added.

## 2018-01-30 ENCOUNTER — OCCUPATIONAL MEDICINE (OUTPATIENT)
Dept: OCCUPATIONAL MEDICINE | Facility: CLINIC | Age: 53
End: 2018-01-30
Payer: COMMERCIAL

## 2018-01-30 VITALS
BODY MASS INDEX: 37.54 KG/M2 | SYSTOLIC BLOOD PRESSURE: 126 MMHG | HEART RATE: 97 BPM | TEMPERATURE: 98.7 F | RESPIRATION RATE: 16 BRPM | HEIGHT: 62 IN | WEIGHT: 204 LBS | DIASTOLIC BLOOD PRESSURE: 82 MMHG | OXYGEN SATURATION: 87 %

## 2018-01-30 DIAGNOSIS — S66.911D STRAIN OF RIGHT WRIST, SUBSEQUENT ENCOUNTER: ICD-10-CM

## 2018-01-30 DIAGNOSIS — S16.1XXD CERVICAL STRAIN, SUBSEQUENT ENCOUNTER: ICD-10-CM

## 2018-01-30 DIAGNOSIS — S39.012D STRAIN OF LUMBAR REGION, SUBSEQUENT ENCOUNTER: ICD-10-CM

## 2018-01-30 PROCEDURE — 99213 OFFICE O/P EST LOW 20 MIN: CPT | Performed by: PREVENTIVE MEDICINE

## 2018-01-30 RX ORDER — DICLOFENAC SODIUM 75 MG/1
75 TABLET, DELAYED RELEASE ORAL 2 TIMES DAILY PRN
Qty: 60 TAB | Refills: 0 | Status: SHIPPED | OUTPATIENT
Start: 2018-01-30 | End: 2018-02-13 | Stop reason: SDUPTHER

## 2018-01-30 RX ORDER — CYCLOBENZAPRINE HCL 5 MG
5 TABLET ORAL 2 TIMES DAILY PRN
Qty: 30 TAB | Refills: 0 | Status: SHIPPED | OUTPATIENT
Start: 2018-01-30 | End: 2018-02-13 | Stop reason: SDUPTHER

## 2018-01-30 ASSESSMENT — ENCOUNTER SYMPTOMS
NAUSEA: 0
HEADACHES: 1
VOMITING: 0
TINGLING: 1
SENSORY CHANGE: 1

## 2018-01-30 NOTE — LETTER
[address],   [address] [place] [address]  Phone:  [phone] - Fax:  [phone]   Occupational Health [facility] Progress Report and Disability Certification  Date of Service: 1/30/2018   No Show:  No  Date / Time of Next Visit: 2/13/2018 @ 4:45pm   Claim Information   Patient Name: Larisa Davis  Claim Number:     Employer: [employer]  Date of Injury: 12/19/2017     Insurer / TPA: Zeynep  ID / SSN:     Occupation: TEACHER  Diagnosis: Diagnoses of Strain of lumbar region, subsequent encounter, Strain of right wrist, subsequent encounter, and Cervical strain, subsequent encounter were pertinent to this visit.    Medical Information   Related to Industrial Injury? 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 She feels pain in her back of neck (causing headache), mid-lower back, hips, wrists, and hands. Patient states that she did have lower back and neck pain. And right shoulder pain. She gets some numbness and tingling down the right arm occasionally. She's been to 3 visits to physical therapy which is helped she states the diclofenac has been helping and the Flexeril sometimes.   Objective Findings: Lumbar: No gross deformity. Diffuse tenderness palpation paraspinal musculature from lower thoracic to lower lumbar.  Range of motion slightly decreased in flexion with some pain. Normal gait. Able to heel/toe walk.  Cervical: No gross deformity. Diffuse tenderness over cervical spine and upper thoracic. Full range of motion with some pain in all planes of motion. Spurling's test negative..  Right shoulder: No gross deformity. Diffuse tenderness over upper trapezius and anterior GH. Full range of motion. Special tests negative. Strength intact.   Pre-Existing Condition(s):     Assessment:   Condition Same    Status: Additional Care  Required  Permanent Disability:No    Plan:      Diagnostics:      Comments:  Continue physical therapy  Continue diclofenac and Flexeril  Okay for full duty  Follow-up 2 weeks we'll consider MRI if no improvement.    Disability Information   Status: Released to Full Duty    From:  1/30/2018  Through: 2/13/2018 Restrictions are:     Physical Restrictions   Sitting:    Standing:    Stooping:    Bending:      Squatting:    Walking:    Climbing:    Pushing:      Pulling:    Other:    Reaching Above Shoulder (L):   Reaching Above Shoulder (R):       Reaching Below Shoulder (L):    Reaching Below Shoulder (R):      Not to exceed Weight Limits   Carrying(hrs):   Weight Limit(lb):   Lifting(hrs):   Weight  Limit(lb):     Comments:      Repetitive Actions   Hands: i.e. Fine Manipulations from Grasping:     Feet: i.e. Operating Foot Controls:     Driving / Operate Machinery:     Physician Name: Eamon Caceres D.O. Physician Signature: usamaSignTAEAMON VANCE D.O. e-Signature: Dr. Jeff Sahu, Medical Director   Clinic Name / Location: [address],   [address] [address] Clinic Phone Number: Dept: [phone]   Appointment Time: 3:40 Pm Visit Start Time: 3:34 PM   Check-In Time:  3:23 Pm Visit Discharge Time:  4:40pm   Original-Treating Physician or Chiropractor    Page 2-Insurer/TPA    Page 3-Employer    Page 4-Employee

## 2018-01-31 NOTE — PROGRESS NOTES
"Subjective:      Larisa Davis is a 53 y.o. female who presents with Follow-Up (   DOI 12/19/17 back injury feeling worse room 1)      DOI: 12/18/17. 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 She feels pain in her back of neck (causing headache), mid-lower back, hips, wrists, and hands. Patient states that she did have lower back and neck pain. And right shoulder pain. She gets some numbness and tingling down the right arm occasionally. She's been to 3 visits to physical therapy which is helped she states the diclofenac has been helping and the Flexeril sometimes.     HPI    Review of Systems   Gastrointestinal: Negative for nausea and vomiting.   Skin: Negative for itching and rash.   Neurological: Positive for tingling, sensory change and headaches.          Objective:     /82   Pulse 97   Temp 37.1 °C (98.7 °F)   Resp 16   Ht 1.575 m (5' 2\")   Wt 92.5 kg (204 lb)   SpO2 (!) 87%   BMI 37.31 kg/m²      Physical Exam   Constitutional: She is oriented to person, place, and time. She appears well-developed and well-nourished.   Cardiovascular: Normal rate.    Pulmonary/Chest: Effort normal.   Neurological: She is alert and oriented to person, place, and time.   Skin: Skin is warm and dry.   Psychiatric: She has a normal mood and affect.       Lumbar: No gross deformity. Diffuse tenderness palpation paraspinal musculature from lower thoracic to lower lumbar.  Range of motion slightly decreased in flexion with some pain. Normal gait. Able to heel/toe walk.  Cervical: No gross deformity. Diffuse tenderness over cervical spine and upper thoracic. Full range of motion with some pain in all planes of motion. Spurling's test negative..  Right shoulder: No gross deformity. Diffuse tenderness over upper trapezius and anterior GH. Full range of motion. Special tests negative. Strength intact.       Assessment/Plan:     1. Strain of " lumbar region, subsequent encounter  - diclofenac EC (VOLTAREN) 75 MG Tablet Delayed Response; Take 1 Tab by mouth 2 times a day as needed. PAIN  Dispense: 60 Tab; Refill: 0  - cyclobenzaprine (FLEXERIL) 5 MG tablet; Take 1 Tab by mouth 2 times a day as needed for Muscle Spasms.  Dispense: 30 Tab; Refill: 0    2. Strain of right wrist, subsequent encounter  - diclofenac EC (VOLTAREN) 75 MG Tablet Delayed Response; Take 1 Tab by mouth 2 times a day as needed. PAIN  Dispense: 60 Tab; Refill: 0  - cyclobenzaprine (FLEXERIL) 5 MG tablet; Take 1 Tab by mouth 2 times a day as needed for Muscle Spasms.  Dispense: 30 Tab; Refill: 0    3. Cervical strain, subsequent encounter    Continue physical therapy  Continue diclofenac and Flexeril  Okay for full duty  Follow-up 2 weeks we'll consider MRI if no improvement.

## 2018-02-13 ENCOUNTER — OCCUPATIONAL MEDICINE (OUTPATIENT)
Dept: OCCUPATIONAL MEDICINE | Facility: CLINIC | Age: 53
End: 2018-02-13
Payer: COMMERCIAL

## 2018-02-13 VITALS
BODY MASS INDEX: 37.54 KG/M2 | TEMPERATURE: 97.8 F | DIASTOLIC BLOOD PRESSURE: 90 MMHG | WEIGHT: 204 LBS | SYSTOLIC BLOOD PRESSURE: 130 MMHG | RESPIRATION RATE: 16 BRPM | HEART RATE: 77 BPM | OXYGEN SATURATION: 98 % | HEIGHT: 62 IN

## 2018-02-13 DIAGNOSIS — S46.911D STRAIN OF RIGHT SHOULDER, SUBSEQUENT ENCOUNTER: ICD-10-CM

## 2018-02-13 DIAGNOSIS — S66.911D STRAIN OF RIGHT WRIST, SUBSEQUENT ENCOUNTER: ICD-10-CM

## 2018-02-13 DIAGNOSIS — S29.019D THORACIC MYOFASCIAL STRAIN, SUBSEQUENT ENCOUNTER: ICD-10-CM

## 2018-02-13 DIAGNOSIS — S16.1XXD CERVICAL STRAIN, SUBSEQUENT ENCOUNTER: ICD-10-CM

## 2018-02-13 DIAGNOSIS — S39.012D STRAIN OF LUMBAR REGION, SUBSEQUENT ENCOUNTER: ICD-10-CM

## 2018-02-13 PROCEDURE — 99214 OFFICE O/P EST MOD 30 MIN: CPT | Performed by: PREVENTIVE MEDICINE

## 2018-02-13 RX ORDER — DICLOFENAC SODIUM 75 MG/1
75 TABLET, DELAYED RELEASE ORAL 2 TIMES DAILY PRN
Qty: 60 TAB | Refills: 0 | Status: SHIPPED | OUTPATIENT
Start: 2018-02-13 | End: 2019-01-24

## 2018-02-13 RX ORDER — CYCLOBENZAPRINE HCL 5 MG
5 TABLET ORAL 2 TIMES DAILY PRN
Qty: 30 TAB | Refills: 0 | Status: SHIPPED | OUTPATIENT
Start: 2018-02-13 | End: 2018-04-17 | Stop reason: SDUPTHER

## 2018-02-13 ASSESSMENT — ENCOUNTER SYMPTOMS
HEADACHES: 1
FOCAL WEAKNESS: 0
VOMITING: 0
TINGLING: 0
NAUSEA: 0
SENSORY CHANGE: 0

## 2018-02-13 ASSESSMENT — PAIN SCALES - GENERAL: PAINLEVEL: 6=MODERATE PAIN

## 2018-02-13 NOTE — LETTER
[address],   [address] [place] [address]  Phone:  [phone] - Fax:  [phone]   Occupational Health [facility] Progress Report and Disability Certification  Date of Service: 2/13/2018   No Show:  No  Date / Time of Next Visit: 3/13/2018 @ 3:45pm   Claim Information   Patient Name: Larisa Davis  Claim Number:     Employer: [employer]  Date of Injury: 12/19/2017     Insurer / TPA: Zeynep  ID / SSN:     Occupation: TEACHER  Diagnosis: Diagnoses of Strain of lumbar region, subsequent encounter, Cervical strain, subsequent encounter, Strain of right shoulder, subsequent encounter, Thoracic myofascial strain, subsequent encounter, and Strain of right wrist, subsequent encounter were pertinent to this visit.    Medical Information   Related to Industrial Injury? 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 She feels pain in her back of neck (causing headache), mid-lower back, hips, wrists, and hands.  Patient knows that that she continues to have pain down her entire spine. Pain seems a little worsening neck and lower back. Denies any radiating pain. Denies numbness. States she's completed physical therapy with only mild improvements. She does state that her right shoulder pain is somewhat improved and has good range of motion. Continues to take diclofenac and Flexeril as needed.    Objective Findings: Lumbar/thoracic: No gross deformity. Diffuse tenderness palpation paraspinal musculature from lower thoracic to lower lumbar. Range of motion decreased with flexion.   Cervical: No gross deformity. Diffuse tenderness over cervical spine and upper thoracic. Full range of motion with some pain in all planes of motion.  Right shoulder: No gross deformity. No anterior or lateral tenderness, some tenderness and upper trapezius. Full range  of motion. Special tests negative. Strength intact.   Pre-Existing Condition(s):     Assessment:   Condition Same    Status: Additional Care Required  Permanent Disability:No    Plan:      Diagnostics:      Comments:  Referral for MRI C-spine, L-spine, and thoracic spine  Continue PT, referred for more visits  Refill diclofenac and Flexeril  Continue full duty  Follow up 4 weeks, sooner if MRI performed sooner    Disability Information   Status: Released to Full Duty    From:  2/13/2018  Through: 3/13/2018 Restrictions are:     Physical Restrictions   Sitting:    Standing:    Stooping:    Bending:      Squatting:    Walking:    Climbing:    Pushing:      Pulling:    Other:    Reaching Above Shoulder (L):   Reaching Above Shoulder (R):       Reaching Below Shoulder (L):    Reaching Below Shoulder (R):      Not to exceed Weight Limits   Carrying(hrs):   Weight Limit(lb):   Lifting(hrs):   Weight  Limit(lb):     Comments:      Repetitive Actions   Hands: i.e. Fine Manipulations from Grasping:     Feet: i.e. Operating Foot Controls:     Driving / Operate Machinery:     Physician Name: Eamon Caceres D.O. Physician Signature: EAMON Stevens D.O. e-Signature: Dr. Jeff Sahu, Medical Director   Clinic Name / Location: [address],   [address]  [place] [address] Clinic Phone Number: Dept: [phone]   Appointment Time: 4:45 Pm Visit Start Time: 4:39 PM   Check-In Time:  4:32 Pm Visit Discharge Time:  5:01pm   Original-Treating Physician or Chiropractor    Page 2-Insurer/TPA    Page 3-Employer    Page 4-Employee

## 2018-02-14 NOTE — PROGRESS NOTES
"Subjective:      Larisa Davis is a 53 y.o. female who presents with Other ([facility] DOI 12/19/18- Same - ROOM 3)      DOI: 12/18/17. 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 She feels pain in her back of neck (causing headache), mid-lower back, hips, wrists, and hands.  Patient knows that that she continues to have pain down her entire spine. Pain seems a little worsening neck and lower back. Denies any radiating pain. Denies numbness. States she's completed physical therapy with only mild improvements. She does state that her right shoulder pain is somewhat improved and has good range of motion. Continues to take diclofenac and Flexeril as needed.      HPI    Review of Systems   Gastrointestinal: Negative for nausea and vomiting.   Skin: Negative for itching and rash.   Neurological: Positive for headaches. Negative for tingling, sensory change and focal weakness.     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     Objective:     /90   Pulse 77   Temp 36.6 °C (97.8 °F)   Resp 16   Ht 1.575 m (5' 2\")   Wt 92.5 kg (204 lb)   SpO2 98%   BMI 37.31 kg/m²      Physical Exam   Constitutional: She is oriented to person, place, and time. She appears well-developed and well-nourished.   Cardiovascular: Normal rate.    Pulmonary/Chest: Effort normal.   Neurological: She is alert and oriented to person, place, and time.   Skin: Skin is warm and dry.   Psychiatric: She has a normal mood and affect. Judgment normal.       Lumbar/thoracic: No gross deformity. Diffuse tenderness palpation paraspinal musculature from lower thoracic to lower lumbar. Range of motion decreased with flexion.   Cervical: No gross deformity. Diffuse tenderness over cervical spine and upper thoracic. Full range of motion with some pain in all planes of motion.  Right shoulder: No gross deformity. No " anterior or lateral tenderness, some tenderness and upper trapezius. Full range of motion. Special tests negative. Strength intact.       Assessment/Plan:     1. Strain of lumbar region, subsequent encounter  - REFERRAL TO RADIOLOGY  - REFERRAL TO PHYSICAL THERAPY Reason for Therapy: Eval/Treat/Report  - MR-LUMBAR SPINE-W/O; Future  - diclofenac EC (VOLTAREN) 75 MG Tablet Delayed Response; Take 1 Tab by mouth 2 times a day as needed. PAIN  Dispense: 60 Tab; Refill: 0  - cyclobenzaprine (FLEXERIL) 5 MG tablet; Take 1 Tab by mouth 2 times a day as needed for Muscle Spasms.  Dispense: 30 Tab; Refill: 0    2. Cervical strain, subsequent encounter  - REFERRAL TO RADIOLOGY  - REFERRAL TO PHYSICAL THERAPY Reason for Therapy: Eval/Treat/Report  - MR-CERVICAL SPINE-W/O; Future    3. Strain of right shoulder, subsequent encounter  - REFERRAL TO PHYSICAL THERAPY Reason for Therapy: Eval/Treat/Report    4. Thoracic myofascial strain, subsequent encounter  - REFERRAL TO RADIOLOGY  - REFERRAL TO PHYSICAL THERAPY Reason for Therapy: Eval/Treat/Report  - MR-THORACIC SPINE-W/O; Future    5. Strain of right wrist, subsequent encounter  - diclofenac EC (VOLTAREN) 75 MG Tablet Delayed Response; Take 1 Tab by mouth 2 times a day as needed. PAIN  Dispense: 60 Tab; Refill: 0  - cyclobenzaprine (FLEXERIL) 5 MG tablet; Take 1 Tab by mouth 2 times a day as needed for Muscle Spasms.  Dispense: 30 Tab; Refill: 0

## 2018-03-13 ENCOUNTER — OCCUPATIONAL MEDICINE (OUTPATIENT)
Dept: OCCUPATIONAL MEDICINE | Facility: CLINIC | Age: 53
End: 2018-03-13
Payer: COMMERCIAL

## 2018-03-13 VITALS
BODY MASS INDEX: 37.54 KG/M2 | RESPIRATION RATE: 14 BRPM | SYSTOLIC BLOOD PRESSURE: 118 MMHG | OXYGEN SATURATION: 96 % | TEMPERATURE: 98 F | DIASTOLIC BLOOD PRESSURE: 84 MMHG | HEART RATE: 85 BPM | WEIGHT: 204 LBS | HEIGHT: 62 IN

## 2018-03-13 DIAGNOSIS — S46.911D STRAIN OF RIGHT SHOULDER, SUBSEQUENT ENCOUNTER: ICD-10-CM

## 2018-03-13 DIAGNOSIS — S29.019D THORACIC MYOFASCIAL STRAIN, SUBSEQUENT ENCOUNTER: ICD-10-CM

## 2018-03-13 DIAGNOSIS — S39.012D STRAIN OF LUMBAR REGION, SUBSEQUENT ENCOUNTER: ICD-10-CM

## 2018-03-13 DIAGNOSIS — S16.1XXD CERVICAL STRAIN, SUBSEQUENT ENCOUNTER: ICD-10-CM

## 2018-03-13 PROCEDURE — 99214 OFFICE O/P EST MOD 30 MIN: CPT | Performed by: PREVENTIVE MEDICINE

## 2018-03-13 ASSESSMENT — ENCOUNTER SYMPTOMS
TINGLING: 1
CHILLS: 0
SENSORY CHANGE: 1
HEADACHES: 1

## 2018-03-13 ASSESSMENT — PAIN SCALES - GENERAL: PAINLEVEL: 4=SLIGHT-MODERATE PAIN

## 2018-03-13 NOTE — LETTER
[address],   [address] [place] [address]  Phone:  [phone] - Fax:  [phone]   Occupational Health [facility] Progress Report and Disability Certification  Date of Service: 3/13/2018   No Show:  No  Date / Time of Next Visit: 4/17/2018 @ 3:45pm   Claim Information   Patient Name: Larisa Davis  Claim Number:     Employer: [employer]  Date of Injury:      Insurer / TPA: Anastasiyasi  ID / SSN:     Occupation:   Diagnosis: Diagnoses of Strain of lumbar region, subsequent encounter, Cervical strain, subsequent encounter, Strain of right shoulder, subsequent encounter, and Thoracic myofascial strain, subsequent encounter were pertinent to this visit.    Medical Information   Related to Industrial Injury? 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 She feels pain in her back of neck (causing headache), mid-lower back, hips, wrists, and hands. Patient has noted gradual improvement with physical therapy. She does continue to get headaches, neck pain and mid back pain and lower back pain but seems less severe. She gets occasional rating pain down the right leg.   Objective Findings: Lumbar/thoracic: No gross deformity. Diffuse tenderness palpation paraspinal musculature from lower thoracic to lower lumbar.    Cervical: No gross deformity. Minimal diffuse tenderness. Full range of motion.   Pre-Existing Condition(s):     Assessment:   Condition Improved    Status: Additional Care Required  Permanent Disability:No    Plan:      Diagnostics:      Comments:  MRI C-Spine: Decreased lordosis, possibly due to muscle spasm. No other findings  MRI T-Spine: Mild degenerative changes at T7-T10, without cord/nerve compression.  MRI L-Spine: Degenerative changes at L3-L4 with bilateral neural foraminal narrowing.    Discussed possible referral to  physiatry, patient like to try more physical therapy first  Continue physical therapy, referral for 4 more visits  Continue diclofenac and Flexeril as needed  Full duty  Follow-up 1 month    Disability Information   Status: Released to Full Duty    From:  3/13/2018  Through: 4/17/2018 Restrictions are:     Physical Restrictions   Sitting:    Standing:    Stooping:    Bending:      Squatting:    Walking:    Climbing:    Pushing:      Pulling:    Other:    Reaching Above Shoulder (L):   Reaching Above Shoulder (R):       Reaching Below Shoulder (L):    Reaching Below Shoulder (R):      Not to exceed Weight Limits   Carrying(hrs):   Weight Limit(lb):   Lifting(hrs):   Weight  Limit(lb):     Comments:      Repetitive Actions   Hands: i.e. Fine Manipulations from Grasping:     Feet: i.e. Operating Foot Controls:     Driving / Operate Machinery:     Physician Name: Eamon Caceres D.O. Physician Signature: usamaSignTAEAMON VANCE D.O. e-Signature: Dr. Jeff Sahu, Medical Director   Clinic Name / Location: [address],   [address] [place] [address] Clinic Phone Number: Dept: [phone]   Appointment Time: 3:45 Pm Visit Start Time: 3:32 PM   Check-In Time:  3:23 Pm Visit Discharge Time:  4pm   Original-Treating Physician or Chiropractor    Page 2-Insurer/TPA    Page 3-Employer    Page 4-Employee

## 2018-03-13 NOTE — PROGRESS NOTES
"Subjective:      Larisa Davis is a 53 y.o. female who presents with Follow-Up ( [facility] DOI 12/19/18 - Back - Better- ROOM 2)      DOI: 12/18/17. 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 She sat on a stool, one of the legs broke, collapsing the seat. She feels pain in her back of neck (causing headache), mid-lower back, hips, wrists, and hands. Patient has noted gradual improvement with physical therapy. She does continue to get headaches, neck pain and mid back pain and lower back pain but seems less severe. She gets occasional rating pain down the right leg.     HPI    Review of Systems   Constitutional: Negative for chills.   Skin: Negative for itching and rash.   Neurological: Positive for tingling, sensory change and headaches.     SOCHX: Works as a teacher   FH: No pertinent family history to this problem.     Objective:     /84   Pulse 85   Temp 36.7 °C (98 °F)   Resp 14   Ht 1.575 m (5' 2\")   Wt 92.5 kg (204 lb)   SpO2 96%   BMI 37.31 kg/m²      Physical Exam   Constitutional: She is oriented to person, place, and time. She appears well-developed and well-nourished.   Cardiovascular: Normal rate.    Pulmonary/Chest: Effort normal.   Neurological: She is alert and oriented to person, place, and time.   Skin: Skin is warm and dry.   Psychiatric: She has a normal mood and affect.       Lumbar/thoracic: No gross deformity. Diffuse tenderness palpation paraspinal musculature from lower thoracic to lower lumbar.    Cervical: No gross deformity. Minimal diffuse tenderness. Full range of motion.       Assessment/Plan:     1. Strain of lumbar region, subsequent encounter  - REFERRAL TO PHYSICAL THERAPY Reason for Therapy: Eval/Treat/Report    2. Cervical strain, subsequent encounter  - REFERRAL TO PHYSICAL THERAPY Reason for Therapy: Eval/Treat/Report    3. Strain of right shoulder, subsequent encounter  - REFERRAL TO PHYSICAL THERAPY Reason for Therapy: Eval/Treat/Report    4. Thoracic " myofascial strain, subsequent encounter  - REFERRAL TO PHYSICAL THERAPY Reason for Therapy: Eval/Treat/Report    MRI C-Spine: Decreased lordosis, possibly due to muscle spasm. No other findings  MRI T-Spine: Mild degenerative changes at T7-T10, without cord/nerve compression.  MRI L-Spine: Degenerative changes at L3-L4 with bilateral neural foraminal narrowing.  Discussed possible referral to physiatry, patient like to try more physical therapy first  Continue physical therapy, referral for 4 more visits  Continue diclofenac and Flexeril as needed  Full duty  Follow-up 1 month

## 2018-04-17 ENCOUNTER — OCCUPATIONAL MEDICINE (OUTPATIENT)
Dept: OCCUPATIONAL MEDICINE | Facility: CLINIC | Age: 53
End: 2018-04-17
Payer: COMMERCIAL

## 2018-04-17 VITALS
HEIGHT: 62 IN | RESPIRATION RATE: 14 BRPM | DIASTOLIC BLOOD PRESSURE: 84 MMHG | BODY MASS INDEX: 37.54 KG/M2 | WEIGHT: 204 LBS | HEART RATE: 82 BPM | SYSTOLIC BLOOD PRESSURE: 122 MMHG | OXYGEN SATURATION: 96 %

## 2018-04-17 DIAGNOSIS — S16.1XXD CERVICAL STRAIN, SUBSEQUENT ENCOUNTER: ICD-10-CM

## 2018-04-17 DIAGNOSIS — S39.012D STRAIN OF LUMBAR REGION, SUBSEQUENT ENCOUNTER: ICD-10-CM

## 2018-04-17 PROCEDURE — 99212 OFFICE O/P EST SF 10 MIN: CPT | Performed by: PREVENTIVE MEDICINE

## 2018-04-17 RX ORDER — CYCLOBENZAPRINE HCL 5 MG
5 TABLET ORAL 2 TIMES DAILY PRN
Qty: 30 TAB | Refills: 0 | Status: SHIPPED | OUTPATIENT
Start: 2018-04-17 | End: 2019-01-24

## 2018-04-17 ASSESSMENT — PAIN SCALES - GENERAL: PAINLEVEL: 2=MINIMAL-SLIGHT

## 2018-04-17 NOTE — LETTER
[address],   [address] [place] [address]  Phone:  [phone] - Fax:  [phone]   [facility] Progress Report and Disability Certification  Date of Service: 4/17/2018   No Show:  No  Date / Time of Next Visit:  Release from care   Claim Information   Patient Name: Larisa Davis  Claim Number:     Employer: [employer]  Date of Injury: 12/19/2017     Insurer / TPA: [employer]  ID / SSN:     Occupation: TEACHER  Diagnosis: Diagnoses of Strain of lumbar region, subsequent encounter and Cervical strain, subsequent encounter were pertinent to this visit.    Medical Information   Related to Industrial Injury? 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 She feels pain in her back of neck (causing headache), mid-lower back, hips, wrists, and hands. MRI C-,T- and L-Spines revealed mild degenerative changes. Patient states that she has improved significantly with physical therapy. She does note occasional symptoms in the lower back and neck. She also notes occasional tingling in her feet which she started recently. However she feels overall improved.    Objective Findings: Lumbar/thoracic: No gross deformity. Full range of motion Normal gait.   Cervical: No gross deformity. .Full range of motion.   Pre-Existing Condition(s):     Assessment:   Condition Improved    Status: Discharged /  MMI  Permanent Disability:No    Plan:      Diagnostics:      Comments:  At this point patient has improved. Patient is medically maximally improved no further treatments recommended. No ratable impairment   Release from care  Full duty  Follow-up as needed    Disability Information   Status: Released to Full Duty    From:  4/17/2018  Through:   Restrictions are:     Physical Restrictions   Sitting:    Standing:    Stooping:    Bending:         Squatting:    Walking:    Climbing:    Pushing:      Pulling:    Other:    Reaching Above Shoulder (L):   Reaching Above Shoulder (R):       Reaching Below Shoulder (L):    Reaching Below Shoulder (R):      Not to exceed Weight Limits   Carrying(hrs):   Weight Limit(lb):   Lifting(hrs):   Weight  Limit(lb):     Comments:      Repetitive Actions   Hands: i.e. Fine Manipulations from Grasping:     Feet: i.e. Operating Foot Controls:     Driving / Operate Machinery:     Physician Name: Eamon Caceres D.O. Physician Signature: EAMON Stevens D.O. e-Signature: Dr. Jeff Sahu, Medical Director   Clinic Name / Location: [address],   [address] [place] [address] Clinic Phone Number: Dept: [phone]   Appointment Time: 3:45 Pm Visit Start Time: 3:36 PM   Check-In Time:  3:30 Pm Visit Discharge Time:  3:50pm   Original-Treating Physician or Chiropractor    Page 2-Insurer/TPA    Page 3-Employer    Page 4-Employee

## 2018-04-17 NOTE — PROGRESS NOTES
"Subjective:      Larisa Davis is a 53 y.o. 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 She feels pain in her back of neck (causing headache), mid-lower back, hips, wrists, and hands. MRI C-,T- and L-Spines revealed mild degenerative changes. Patient states that she has improved significantly with physical therapy. She does note occasional symptoms in the lower back and neck. She also notes occasional tingling in her feet which she started recently. However she feels overall improved.      HPI    ROS       Objective:     /84   Pulse 82   Resp 14   Ht 1.575 m (5' 2\")   Wt 92.5 kg (204 lb)   SpO2 96%   BMI 37.31 kg/m²      Physical Exam    Lumbar/thoracic: No gross deformity. Full range of motion Normal gait.   Cervical: No gross deformity. .Full range of motion.       Assessment/Plan:     1. Strain of lumbar region, subsequent encounter    2. Cervical strain, subsequent encounter    At this point patient has improved. Patient is medically maximally improved no further treatments recommended. No ratable impairment   Release from care  Full duty  Follow-up as needed      "

## 2018-06-24 DIAGNOSIS — I10 ESSENTIAL HYPERTENSION: ICD-10-CM

## 2018-06-25 RX ORDER — PROPRANOLOL HYDROCHLORIDE 80 MG/1
CAPSULE, EXTENDED RELEASE ORAL
Qty: 90 CAP | Refills: 1 | Status: SHIPPED | OUTPATIENT
Start: 2018-06-25 | End: 2018-12-26 | Stop reason: SDUPTHER

## 2018-06-25 NOTE — TELEPHONE ENCOUNTER
Was the patient seen in the last year in this department? Yes     Does patient have an active prescription for medications requested? No     Received Request Via: Pharmacy      Pt met protocol?: No pt last ov 11/17    BP Readings from Last 1 Encounters:   04/17/18 122/84

## 2018-06-25 NOTE — TELEPHONE ENCOUNTER
Refill X 6 months, sent to pharmacy.Pt. Seen in the last 6 months per protocol.   Lab Results   Component Value Date/Time    SODIUM 139 07/24/2017 07:08 AM    POTASSIUM 4.2 07/24/2017 07:08 AM    CHLORIDE 106 07/24/2017 07:08 AM    CO2 28 07/24/2017 07:08 AM    GLUCOSE 106 (H) 07/24/2017 07:08 AM    BUN 15 07/24/2017 07:08 AM    CREATININE 0.99 07/24/2017 07:08 AM    CREATININE 0.84 12/29/2010 12:00 AM    BUNCREATRAT 15 12/29/2010 12:00 AM    GLOMRATE >59 12/29/2010 12:00 AM

## 2018-12-26 DIAGNOSIS — I10 ESSENTIAL HYPERTENSION: ICD-10-CM

## 2018-12-27 RX ORDER — PROPRANOLOL HYDROCHLORIDE 80 MG/1
CAPSULE, EXTENDED RELEASE ORAL
Qty: 90 CAP | Refills: 0 | Status: SHIPPED | OUTPATIENT
Start: 2018-12-27 | End: 2019-03-08 | Stop reason: SDUPTHER

## 2019-01-24 DIAGNOSIS — R07.89 CHEST PRESSURE: ICD-10-CM

## 2019-01-24 RX ORDER — DICLOFENAC SODIUM 75 MG/1
75 TABLET, DELAYED RELEASE ORAL 2 TIMES DAILY
Qty: 60 TAB | Refills: 0 | Status: SHIPPED | OUTPATIENT
Start: 2019-01-24 | End: 2019-03-08 | Stop reason: SDUPTHER

## 2019-01-24 RX ORDER — CYCLOBENZAPRINE HCL 5 MG
5 TABLET ORAL 2 TIMES DAILY PRN
Qty: 60 TAB | Refills: 0 | Status: SHIPPED | OUTPATIENT
Start: 2019-01-24 | End: 2019-03-08 | Stop reason: SDUPTHER

## 2019-01-24 NOTE — TELEPHONE ENCOUNTER
Dr. Milner, You haven't seen this patient since 11/17. Please refill requested meds if you see fit.

## 2019-01-24 NOTE — TELEPHONE ENCOUNTER
Was the patient seen in the last year in this department? No     Does patient have an active prescription for medications requested? No     Received Request Via: Patient      Pt met protocol?: Yes, last ov 11/17  Pt has been seeing occupational health clinic  These medications were last prescribed by that

## 2019-03-08 ENCOUNTER — OFFICE VISIT (OUTPATIENT)
Dept: MEDICAL GROUP | Facility: PHYSICIAN GROUP | Age: 54
End: 2019-03-08
Payer: COMMERCIAL

## 2019-03-08 VITALS
OXYGEN SATURATION: 96 % | DIASTOLIC BLOOD PRESSURE: 78 MMHG | RESPIRATION RATE: 12 BRPM | TEMPERATURE: 99.7 F | WEIGHT: 223 LBS | BODY MASS INDEX: 41.04 KG/M2 | HEIGHT: 62 IN | HEART RATE: 79 BPM | SYSTOLIC BLOOD PRESSURE: 118 MMHG

## 2019-03-08 DIAGNOSIS — M54.50 CHRONIC MIDLINE LOW BACK PAIN WITHOUT SCIATICA: ICD-10-CM

## 2019-03-08 DIAGNOSIS — Z13.6 SCREENING FOR CARDIOVASCULAR CONDITION: ICD-10-CM

## 2019-03-08 DIAGNOSIS — K21.9 GASTROESOPHAGEAL REFLUX DISEASE WITHOUT ESOPHAGITIS: Chronic | ICD-10-CM

## 2019-03-08 DIAGNOSIS — Z13.29 SCREENING FOR THYROID DISORDER: ICD-10-CM

## 2019-03-08 DIAGNOSIS — Z13.21 ENCOUNTER FOR VITAMIN DEFICIENCY SCREENING: ICD-10-CM

## 2019-03-08 DIAGNOSIS — I10 ESSENTIAL HYPERTENSION: ICD-10-CM

## 2019-03-08 DIAGNOSIS — G89.29 CHRONIC MIDLINE LOW BACK PAIN WITHOUT SCIATICA: ICD-10-CM

## 2019-03-08 DIAGNOSIS — F41.9 ANXIETY: ICD-10-CM

## 2019-03-08 DIAGNOSIS — R53.83 OTHER FATIGUE: ICD-10-CM

## 2019-03-08 DIAGNOSIS — G47.33 OBSTRUCTIVE SLEEP APNEA SYNDROME: ICD-10-CM

## 2019-03-08 DIAGNOSIS — Z12.39 BREAST CANCER SCREENING: ICD-10-CM

## 2019-03-08 DIAGNOSIS — R73.03 PREDIABETES: ICD-10-CM

## 2019-03-08 DIAGNOSIS — G47.00 INSOMNIA, UNSPECIFIED TYPE: ICD-10-CM

## 2019-03-08 PROBLEM — R19.5 DARK STOOLS: Status: RESOLVED | Noted: 2017-11-09 | Resolved: 2019-03-08

## 2019-03-08 PROCEDURE — 99214 OFFICE O/P EST MOD 30 MIN: CPT | Performed by: NURSE PRACTITIONER

## 2019-03-08 RX ORDER — ALPRAZOLAM 0.5 MG/1
0.5 TABLET ORAL 2 TIMES DAILY PRN
Qty: 30 TAB | Refills: 1 | Status: SHIPPED | OUTPATIENT
Start: 2019-03-08 | End: 2019-06-06

## 2019-03-08 RX ORDER — CYCLOBENZAPRINE HCL 5 MG
5 TABLET ORAL 2 TIMES DAILY PRN
Qty: 60 TAB | Refills: 0 | Status: SHIPPED | OUTPATIENT
Start: 2019-03-08 | End: 2019-06-22 | Stop reason: SDUPTHER

## 2019-03-08 RX ORDER — PROPRANOLOL HYDROCHLORIDE 80 MG/1
80 CAPSULE, EXTENDED RELEASE ORAL
Qty: 90 CAP | Refills: 1 | Status: SHIPPED | OUTPATIENT
Start: 2019-03-08 | End: 2019-09-26 | Stop reason: SDUPTHER

## 2019-03-08 RX ORDER — FLUTICASONE PROPIONATE 50 MCG
1 SPRAY, SUSPENSION (ML) NASAL DAILY
Qty: 16 G | Refills: 11 | Status: SHIPPED | OUTPATIENT
Start: 2019-03-08 | End: 2020-03-30

## 2019-03-08 RX ORDER — OMEPRAZOLE 40 MG/1
CAPSULE, DELAYED RELEASE ORAL
Qty: 90 CAP | Refills: 3 | Status: SHIPPED | OUTPATIENT
Start: 2019-03-08 | End: 2020-04-15

## 2019-03-08 RX ORDER — DICLOFENAC SODIUM 75 MG/1
75 TABLET, DELAYED RELEASE ORAL 2 TIMES DAILY
Qty: 60 TAB | Refills: 0 | Status: SHIPPED | OUTPATIENT
Start: 2019-03-08 | End: 2019-06-22 | Stop reason: SDUPTHER

## 2019-03-08 ASSESSMENT — PAIN SCALES - GENERAL: PAINLEVEL: NO PAIN

## 2019-03-08 ASSESSMENT — PATIENT HEALTH QUESTIONNAIRE - PHQ9: CLINICAL INTERPRETATION OF PHQ2 SCORE: 0

## 2019-03-08 NOTE — ASSESSMENT & PLAN NOTE
This is a chronic health problem that is well controlled with current medications and lifestyle measures.  Patient manages with lifestyle measures usually, which include talking with coworkers.  She teaches eighth graders.  She reports this year has been a little more stressful as the students are more challenging and her close coworker friend has moved.  Patient reports she takes alprazolam 0.5 mg occasionally, usually 3-4 times a month.  Patient is requesting a refill at this time.  We reviewed other nonmedication options including routine aerobic exercise and counseling.  Patient reports that she has recently started using her treadmill 3 times a week.  She declines counseling at this time.  We did also discuss in the future if anxiety becomes a continual problem for her that she could start on a medication such as an SSRI.  Patient will consider this.  Will refill alprazolam today.   reviewed and consistent.  Patient last filled alprazolam a year and a half ago for 30 tabs.  We discussed risks of benzodiazepines and increased risks, including respiratory depression and death, when combined with alcohol or opioids.  Patient understands not to take opioids or alcohol when taking alprazolam.

## 2019-03-08 NOTE — ASSESSMENT & PLAN NOTE
This is a chronic health problem that is well controlled with current medications and lifestyle measures.  Quality of pain is aching.  Patient takes diclofenac and cyclobenzaprine as needed.  She completed physical therapy at the end of last year.  Patient reports she has been working on stretches and exercises at home.  She is requesting refill of diclofenac and cyclobenzaprine today.

## 2019-03-08 NOTE — PROGRESS NOTES
CC: Sleep apnea, GERD, anxiety    HISTORY OF THE PRESENT ILLNESS: Patient is a 54 y.o. female. This pleasant patient is here today for evaluation management of the following health problems.  Patient is new to me.  Her primary care provider is Dr. Milner.    Health Maintenance: due      Sleep apnea  This is a chronic health problem that is uncontrolled with current medications and lifestyle measures.  Patient reports she is been using his CPAP for years.  She does not think the settings or the fit of the mask are working anymore.  She has had an increase in fatigue in the last 6 months.  She reports is been almost 10 years since she has seen sleep specialist.  She is open to referral to sleep specialty.      GERD (gastroesophageal reflux disease)  This is a chronic health problem that is well controlled with current medications and lifestyle measures.  Takes omeprazole 40 mg as needed.  Avoids trigger foods and lying down flat after eating.  Patient reports she takes omeprazole approximately 2-3 times a week.  Denies nausea, vomiting, epigastric pain, blood in stool.      Fatigue  Patient reports this is been a chronic problem, worsening in the last 6 months.  Patient reports CPAP not fitting well.  Wakes frequently.  Will refer patient to sleep studies for further evaluation.  We will also get updated lab work.      Chronic midline low back pain without sciatica  This is a chronic health problem that is well controlled with current medications and lifestyle measures.  Quality of pain is aching.  Patient takes diclofenac and cyclobenzaprine as needed.  She completed physical therapy at the end of last year.  Patient reports she has been working on stretches and exercises at home.  She is requesting refill of diclofenac and cyclobenzaprine today.      Anxiety  This is a chronic health problem that is well controlled with current medications and lifestyle measures.  Patient manages with lifestyle measures usually,  which include talking with coworkers.  She teaches eighth graders.  She reports this year has been a little more stressful as the students are more challenging and her close coworker friend has moved.  Patient reports she takes alprazolam 0.5 mg occasionally, usually 3-4 times a month.  Patient is requesting a refill at this time.  We reviewed other nonmedication options including routine aerobic exercise and counseling.  Patient reports that she has recently started using her treadmill 3 times a week.  She declines counseling at this time.  We did also discuss in the future if anxiety becomes a continual problem for her that she could start on a medication such as an SSRI.  Patient will consider this.  Will refill alprazolam today.   reviewed and consistent.  Patient last filled alprazolam a year and a half ago for 30 tabs.  We discussed risks of benzodiazepines and increased risks, including respiratory depression and death, when combined with alcohol or opioids.  Patient understands not to take opioids or alcohol when taking alprazolam.        Allergies: Pcn [penicillins]    Current Outpatient Prescriptions Ordered in [facility]   Medication Sig Dispense Refill   • Omega-3 Fatty Acids (FISH OIL PO) Take  by mouth.     • omeprazole (PRILOSEC) 40 MG delayed-release capsule TAKE 1 CAPSULE BY MOUTH DAILY 90 Cap 3   • diclofenac EC (VOLTAREN) 75 MG Tablet Delayed Response Take 1 Tab by mouth 2 times a day. As needed for pain. 60 Tab 0   • cyclobenzaprine (FLEXERIL) 5 MG tablet Take 1 Tab by mouth 2 times a day as needed. 60 Tab 0   • ALPRAZolam (XANAX) 0.5 MG Tab Take 1 Tab by mouth 2 times a day as needed for Sleep or Anxiety for up to 90 days. 30 Tab 1   • fluticasone (FLONASE) 50 MCG/ACT nasal spray Spray 1 Spray in nose every day. 16 g 11   • propranolol CR (INDERAL LA) 80 MG CAPSULE SR 24 HR Take 1 Cap by mouth every day. 90 Cap 1   • Multiple Vitamin (MULTI VITAMIN DAILY PO) Take  by mouth.     • Cholecalciferol  "(VITAMIN D PO) Take  by mouth.     • Magnesium 100 MG Cap Take  by mouth.     • Calcium Carbonate-Vitamin D (CALCIUM + D PO) Take  by mouth.     • VIRTUSSIN A/C Solution oral solution TAKE 5 ML BY MOUTH EVERY 4 HOURS AS NEEDED FOR COUGH (Patient not taking: Reported on 3/8/2019) 200 mL 0   • albuterol 108 (90 Base) MCG/ACT Aero Soln inhalation aerosol Inhale 2 Puffs by mouth every 6 hours as needed for Shortness of Breath. 8.5 g 1   • Misc. Devices MISC New CPAP mask and supplies; dog chewed up mask; fax to [employer] 1 Device 3     No current Epic-ordered facility-administered medications on file.        Past Medical History:   Diagnosis Date   • Fatigue 12/28/2010   • GERD (gastroesophageal reflux disease) 12/28/2010   • Hypertension    • Hypoglycemia 12/28/2010   • Irregular menses 12/28/2010   • Lactose intolerance 12/28/2010   • Menopause, premature 7/24/2012   • Menorrhagia 1/18/2011   • Nocturnal muscle spasm 12/28/2010   • Primary snoring 12/28/2010   • Unspecified vitamin D deficiency 12/28/2010       No past surgical history on file.    Social History   Substance Use Topics   • Smoking status: Former Smoker     Packs/day: 1.00     Years: 10.00     Quit date: 1/2/1989   • Smokeless tobacco: Never Used   • Alcohol use 7.0 oz/week     14 Glasses of wine per week       Family History   Problem Relation Age of Onset   • Non-contributory Mother         retinal detachment   • Cancer Maternal Grandfather 92        colon cancer       ROS:     As in HPI, otherwise negative for chest pain, dyspnea, abdominal pain, dysuria, blood in stool, fever.  Positive for sinus congestion, mild sore throat, mild cough, onset 3 days ago.      Exam: Blood pressure 118/78, pulse 79, temperature 37.6 °C (99.7 °F), temperature source Temporal, resp. rate 12, height 1.575 m (5' 2\"), weight 101.2 kg (223 lb), last menstrual period 03/31/2011, SpO2 96 %, not currently breastfeeding. Body mass index is 40.79 kg/m².    General: Alert, " pleasant, obese habitus, well nourished, well developed female in NAD  HEENT: Normocephalic. Eyes conjunctiva clear lids without ptosis, pupils equal and reactive to light, ears normal shape and contour, canals are clear bilaterally, tympanic membranes are pearly gray with good light reflex, nasal mucosa without erythema and drainage, oropharynx is cobblestone appearance without erythema, edema or exudates.   Neck: Supple without bruit. Thyroid is not enlarged.  Pulmonary: Clear to ausculation.  Normal effort. No rales, ronchi, or wheezing.  Cardiovascular: Normal rate and rhythm without murmur. Carotid and radial pulses are intact and equal bilaterally.  No lower extremity edema.  Abdomen: Soft, nontender, nondistended. Normal bowel sounds. Liver and spleen are not palpable   Neurologic: Grossly nonfocal  Lymph: No cervical or supraclavicular lymph nodes are palpable  Skin: Warm and dry.  No obvious lesions.  Musculoskeletal: Normal gait.   Psych: Normal mood and affect. Alert and oriented. Judgment and insight is normal.    Please note that this dictation was created using voice recognition software. I have made every reasonable attempt to correct obvious errors, but I expect that there are errors of grammar and possibly content that I did not discover before finalizing the note.      Assessment/Plan  1. Obstructive sleep apnea syndrome  Will refer to sleep studies for further evaluation and updated CPAP settings and mask fit.  - REFERRAL TO SLEEP STUDIES    2. Gastroesophageal reflux disease without esophagitis  Continue with lifestyle measures and omeprazole as needed.  - omeprazole (PRILOSEC) 40 MG delayed-release capsule; TAKE 1 CAPSULE BY MOUTH DAILY  Dispense: 90 Cap; Refill: 3    3.  Chronic midline low back pain without sciatica  Continue with home exercises and stretching and diclofenac and cyclobenzaprine as needed.  Will get updated GFR.  - diclofenac EC (VOLTAREN) 75 MG Tablet Delayed Response; Take 1  Tab by mouth 2 times a day. As needed for pain.  Dispense: 60 Tab; Refill: 0  - cyclobenzaprine (FLEXERIL) 5 MG tablet; Take 1 Tab by mouth 2 times a day as needed.  Dispense: 60 Tab; Refill: 0  - REFERRAL TO PHYSICAL THERAPY Reason for Therapy: Eval/Treat/Report    4. Anxiety  Continue with routine aerobic exercise.  Continue with alprazolam as needed.  Consider SSRI and/or counseling if worsening symptoms.  Risks of benzodiazepines reviewed with patient.  Unable to sign consent for controlled substance today as printer was not working.  - ALPRAZolam (XANAX) 0.5 MG Tab; Take 1 Tab by mouth 2 times a day as needed for Sleep or Anxiety for up to 90 days.  Dispense: 30 Tab; Refill: 1    5. Insomnia, unspecified type    - ALPRAZolam (XANAX) 0.5 MG Tab; Take 1 Tab by mouth 2 times a day as needed for Sleep or Anxiety for up to 90 days.  Dispense: 30 Tab; Refill: 1    6.  Essential hypertension  Patient requesting refill of medication today.  - propranolol CR (INDERAL LA) 80 MG CAPSULE SR 24 HR; Take 1 Cap by mouth every day.  Dispense: 90 Cap; Refill: 1  - Comp Metabolic Panel; Future  - Lipid Profile; Future    7. Screening for cardiovascular condition  Will review results at next appointment.  - Lipid Profile; Future  - ESTIMATED GFR; Future    8. Other fatigue  Will review results at next appointment.  - TSH; Future  - FREE THYROXINE; Future  - VITAMIN D,25 HYDROXY; Future  - CBC WITHOUT DIFFERENTIAL; Future    9. Screening for thyroid disorder    - TSH; Future  - FREE THYROXINE; Future    10. Prediabetes    - HEMOGLOBIN A1C; Future    11. Encounter for vitamin deficiency screening    - HEMOGLOBIN A1C; Future    12. Breast cancer screening  Ordered.  - MA-SCREENING MAMMO BILAT W/TOMOSYNTHESIS W/CAD; Future    Patient reports symptoms of a viral URI today.  I did explain to patient that if symptoms do not improve or if they worsen to return to clinic/urgent care.    Patient will return to clinic in 3 months with  primary care provider, Dr. Milner, for lab review and hypertension, anxiety.  Patient will return to clinic sooner if needed.

## 2019-03-08 NOTE — ASSESSMENT & PLAN NOTE
This is a chronic health problem that is uncontrolled with current medications and lifestyle measures.  Patient reports she is been using his CPAP for years.  She does not think the settings or the fit of the mask are working anymore.  She has had an increase in fatigue in the last 6 months.  She reports is been almost 10 years since she has seen sleep specialist.  She is open to referral to sleep specialty.

## 2019-03-08 NOTE — ASSESSMENT & PLAN NOTE
This is a chronic health problem that is well controlled with current medications and lifestyle measures.  Takes omeprazole 40 mg as needed.  Avoids trigger foods and lying down flat after eating.  Patient reports she takes omeprazole approximately 2-3 times a week.  Denies nausea, vomiting, epigastric pain, blood in stool.

## 2019-03-09 PROBLEM — J02.9 PHARYNGITIS: Status: RESOLVED | Noted: 2017-11-09 | Resolved: 2019-03-09

## 2019-04-07 ENCOUNTER — OFFICE VISIT (OUTPATIENT)
Dept: URGENT CARE | Facility: PHYSICIAN GROUP | Age: 54
End: 2019-04-07
Payer: COMMERCIAL

## 2019-04-07 VITALS
DIASTOLIC BLOOD PRESSURE: 80 MMHG | RESPIRATION RATE: 14 BRPM | OXYGEN SATURATION: 95 % | HEIGHT: 62 IN | BODY MASS INDEX: 41.04 KG/M2 | HEART RATE: 78 BPM | WEIGHT: 223 LBS | SYSTOLIC BLOOD PRESSURE: 118 MMHG | TEMPERATURE: 98.4 F

## 2019-04-07 DIAGNOSIS — S91.331A PUNCTURE WOUND OF RIGHT FOOT, INITIAL ENCOUNTER: ICD-10-CM

## 2019-04-07 PROCEDURE — 90715 TDAP VACCINE 7 YRS/> IM: CPT | Performed by: PHYSICIAN ASSISTANT

## 2019-04-07 PROCEDURE — 90471 IMMUNIZATION ADMIN: CPT | Performed by: PHYSICIAN ASSISTANT

## 2019-04-07 PROCEDURE — 99213 OFFICE O/P EST LOW 20 MIN: CPT | Mod: 25 | Performed by: PHYSICIAN ASSISTANT

## 2019-04-07 NOTE — PROGRESS NOTES
Chief Complaint   Patient presents with   • Other     Stepped on the nazia nail/ R foot/ Need Tdap shot       HISTORY OF PRESENT ILLNESS: Patient is a 54 y.o. female who presents today for the following:    Patient comes in for evaluation of a puncture wound on her right foot.  [place].  She has been keeping it clean and apply Neosporin.  She denies any significant pain but states she needs an updated tetanus shot.    Patient Active Problem List    Diagnosis Date Noted   • Chronic midline low back pain without sciatica 03/08/2019   • Irritable bowel syndrome with both constipation and diarrhea 06/21/2017   • Obesity (BMI 35.0-39.9 without comorbidity) 06/21/2017   • Enlarged liver 08/06/2015   • Prediabetes 07/31/2015   • BMI 32.0-32.9,adult 07/02/2015   • Sleep apnea 07/02/2015   • HTN (hypertension) 12/15/2014   • Hot flashes 12/15/2014   • Anxiety 10/08/2013   • Undiagnosed cardiac murmurs 04/30/2013   • Heart palpitations 04/09/2013   • Menopause, premature 07/24/2012   • Obese 07/24/2012   • GERD (gastroesophageal reflux disease) 12/28/2010   • Vitamin D deficiency 12/28/2010   • Fatigue 12/28/2010   • Lactose intolerance 12/28/2010       Allergies:Pcn [penicillins]    Current Outpatient Prescriptions Ordered in [facility]   Medication Sig Dispense Refill   • Omega-3 Fatty Acids (FISH OIL PO) Take  by mouth.     • omeprazole (PRILOSEC) 40 MG delayed-release capsule TAKE 1 CAPSULE BY MOUTH DAILY 90 Cap 3   • diclofenac EC (VOLTAREN) 75 MG Tablet Delayed Response Take 1 Tab by mouth 2 times a day. As needed for pain. 60 Tab 0   • cyclobenzaprine (FLEXERIL) 5 MG tablet Take 1 Tab by mouth 2 times a day as needed. 60 Tab 0   • ALPRAZolam (XANAX) 0.5 MG Tab Take 1 Tab by mouth 2 times a day as needed for Sleep or Anxiety for up to 90 days. 30 Tab 1   • fluticasone (FLONASE) 50 MCG/ACT nasal spray Spray 1 Spray in nose every day. 16 g 11   • propranolol  CR (INDERAL LA) 80 MG CAPSULE SR 24 HR Take 1 Cap by mouth every day. 90 Cap 1   • VIRTUSSIN A/C Solution oral solution TAKE 5 ML BY MOUTH EVERY 4 HOURS AS NEEDED FOR COUGH (Patient not taking: Reported on 3/8/2019) 200 mL 0   • albuterol 108 (90 Base) MCG/ACT Aero Soln inhalation aerosol Inhale 2 Puffs by mouth every 6 hours as needed for Shortness of Breath. 8.5 g 1   • Multiple Vitamin (MULTI VITAMIN DAILY PO) Take  by mouth.     • Cholecalciferol (VITAMIN D PO) Take  by mouth.     • Magnesium 100 MG Cap Take  by mouth.     • Calcium Carbonate-Vitamin D (CALCIUM + D PO) Take  by mouth.     • Misc. Devices MISC New CPAP mask and supplies; dog chewed up mask; fax to [facility] Device 3     No current Epic-ordered facility-administered medications on file.        Past Medical History:   Diagnosis Date   • Fatigue 2010   • GERD (gastroesophageal reflux disease) 2010   • Hypertension    • Hypoglycemia 2010   • Irregular menses 2010   • Lactose intolerance 2010   • Menopause, premature 2012   • Menorrhagia 2011   • Nocturnal muscle spasm 2010   • Primary snoring 2010   • Unspecified vitamin D deficiency 2010       Social History   Substance Use Topics   • Smoking status: Former Smoker     Packs/day: 1.00     Years: 10.00     Quit date: 1989   • Smokeless tobacco: Never Used   • Alcohol use 7.0 oz/week     14 Glasses of wine per week       Family Status   Relation Status   • Mo Alive        76 in    • Fa  at age 31        suicide   • MGFa  at age 93        colon cancer     Family History   Problem Relation Age of Onset   • Non-contributory Mother         retinal detachment   • Cancer Maternal Grandfather 92        colon cancer       Review of Systems   Constitutional ROS: No unexpected change in weight, No weakness, No fatigue  Eye ROS: No recent significant change in vision, No eye pain, redness, discharge  Ear ROS: No drainage, No  "tinnitus or vertigo, No recent change in hearing  Mouth/Throat ROS: No teeth or gum problems, No bleeding gums, No tongue complaints  Neck ROS: No swollen glands, No significant pain in neck  Pulmonary ROS: No chronic cough, sputum, or hemoptysis, No dyspnea on exertion, No wheezing  Cardiovascular ROS: No diaphoresis, No edema, No palpitations  Gastrointestinal ROS: No change in bowel habits, No significant change in appetite, No nausea, vomiting, diarrhea, or constipation  Musculoskeletal/Extremities ROS: Puncture wound right foot.      Exam:  /80   Pulse 78   Temp 36.9 °C (98.4 °F) (Temporal)   Resp 14   Ht 1.575 m (5' 2\")   Wt 101.2 kg (223 lb)   SpO2 95%   General: Well developed, well nourished. No distress.  HEENT: Head is grossly normal.  Pulmonary: Unlabored respiratory effort.    Neurologic: Grossly nonfocal. No facial asymmetry noted.  Skin: Warm, dry, good turgor.  Puncture wound is noted on the right heel without any surrounding erythema, drainage, or significant tenderness.  Psych: Normal mood. Alert and oriented x3. Judgment and insight is normal.    tdap IM given in clinic    Assessment/Plan:  Discussed wound care at home.  Follow-up for any signs of infection as discussed in clinic.  1. Puncture wound of right foot, initial encounter  Tdap =>7yo IM       "

## 2019-06-22 DIAGNOSIS — G89.29 CHRONIC MIDLINE LOW BACK PAIN WITHOUT SCIATICA: ICD-10-CM

## 2019-06-22 DIAGNOSIS — M54.50 CHRONIC MIDLINE LOW BACK PAIN WITHOUT SCIATICA: ICD-10-CM

## 2019-06-24 RX ORDER — CYCLOBENZAPRINE HCL 5 MG
TABLET ORAL
Qty: 60 TAB | Refills: 0 | Status: SHIPPED | OUTPATIENT
Start: 2019-06-24 | End: 2020-01-21 | Stop reason: SDUPTHER

## 2019-06-24 RX ORDER — DICLOFENAC SODIUM 75 MG/1
TABLET, DELAYED RELEASE ORAL
Qty: 60 TAB | Refills: 0 | Status: SHIPPED | OUTPATIENT
Start: 2019-06-24 | End: 2019-09-26 | Stop reason: SDUPTHER

## 2019-07-02 ENCOUNTER — OFFICE VISIT (OUTPATIENT)
Dept: MEDICAL GROUP | Facility: PHYSICIAN GROUP | Age: 54
End: 2019-07-02
Payer: COMMERCIAL

## 2019-07-02 VITALS
HEART RATE: 84 BPM | TEMPERATURE: 98.4 F | HEIGHT: 63 IN | DIASTOLIC BLOOD PRESSURE: 80 MMHG | WEIGHT: 222 LBS | RESPIRATION RATE: 16 BRPM | BODY MASS INDEX: 39.34 KG/M2 | OXYGEN SATURATION: 96 % | SYSTOLIC BLOOD PRESSURE: 118 MMHG

## 2019-07-02 DIAGNOSIS — R73.03 PREDIABETES: ICD-10-CM

## 2019-07-02 DIAGNOSIS — E55.9 VITAMIN D DEFICIENCY: Chronic | ICD-10-CM

## 2019-07-02 DIAGNOSIS — E78.5 DYSLIPIDEMIA: ICD-10-CM

## 2019-07-02 DIAGNOSIS — Z12.39 BREAST CANCER SCREENING: ICD-10-CM

## 2019-07-02 DIAGNOSIS — R10.13 EPIGASTRIC PAIN: ICD-10-CM

## 2019-07-02 DIAGNOSIS — E78.2 MIXED HYPERLIPIDEMIA: ICD-10-CM

## 2019-07-02 DIAGNOSIS — K21.9 GASTROESOPHAGEAL REFLUX DISEASE WITHOUT ESOPHAGITIS: Chronic | ICD-10-CM

## 2019-07-02 DIAGNOSIS — I10 ESSENTIAL HYPERTENSION: ICD-10-CM

## 2019-07-02 DIAGNOSIS — E66.9 OBESITY (BMI 30-39.9): ICD-10-CM

## 2019-07-02 PROCEDURE — 99214 OFFICE O/P EST MOD 30 MIN: CPT | Performed by: FAMILY MEDICINE

## 2019-07-02 NOTE — ASSESSMENT & PLAN NOTE
Results for SALLIE LOPEZ (MRN [id number]) as of 7/2/2019 13:07   Ref. Range 7/24/2017 07:08   Cholesterol,Tot Latest Ref Range: 100 - 199 mg/dL 229 (H)   Triglycerides Latest Ref Range: 0 - 149 mg/dL 212 (H)   HDL Latest Ref Range: >=40 mg/dL 44   LDL Latest Ref Range: <100 mg/dL 143 (H)   Patient takes an omega-3 fatty acid supplement.  Repeat labs ordered for this year.  Advised on diet low in saturated fats and processed foods.

## 2019-07-02 NOTE — ASSESSMENT & PLAN NOTE
Patient notes worse acid reflux lately also some stomach upset with nausea before meals.   She is on prilosec 40 mg been on it more than 5 years now.   Reflux precautions discussed.   Will check hpylori with next labs. Last EGD was about 6-7 years ago. Will refer back to GI.

## 2019-07-02 NOTE — ASSESSMENT & PLAN NOTE
Chronic condition well-controlled patient takes propanolol 80 mg daily heart rate is 84 blood pressure today 118/80

## 2019-07-02 NOTE — PROGRESS NOTES
Subjective:   Sallie Lopez is a 54 y.o. female here today for evaluation and management of:     Vitamin D deficiency  Continue daily vitamin D supplement, recheck vitamin D level in lab.    HTN (hypertension)  Chronic condition well-controlled patient takes propanolol 80 mg daily heart rate is 84 blood pressure today 118/80    Mixed hyperlipidemia  Results for SALLIE LOPEZ (MRN [id number]) as of 7/2/2019 13:07   Ref. Range 7/24/2017 07:08   Cholesterol,Tot Latest Ref Range: 100 - 199 mg/dL 229 (H)   Triglycerides Latest Ref Range: 0 - 149 mg/dL 212 (H)   HDL Latest Ref Range: >=40 mg/dL 44   LDL Latest Ref Range: <100 mg/dL 143 (H)   Patient takes an omega-3 fatty acid supplement.  Repeat labs ordered for this year.  Advised on diet low in saturated fats and processed foods.    GERD (gastroesophageal reflux disease)  Patient notes worse acid reflux lately also some stomach upset with nausea before meals.   She is on prilosec 40 mg been on it more than 5 years now.   Reflux precautions discussed.   Will check hpylori with next labs. Last EGD was about 6-7 years ago. Will refer back to GI.          Current medicines (including changes today)  Current Outpatient Prescriptions   Medication Sig Dispense Refill   • diclofenac EC (VOLTAREN) 75 MG Tablet Delayed Response TAKE 1 TABLET BY MOUTH TWICE DAILY AS NEEDED FOR PAIN 60 Tab 0   • cyclobenzaprine (FLEXERIL) 5 MG tablet TAKE 1 TABLET BY MOUTH TWICE DAILY AS NEEDED 60 Tab 0   • Omega-3 Fatty Acids (FISH OIL PO) Take  by mouth.     • omeprazole (PRILOSEC) 40 MG delayed-release capsule TAKE 1 CAPSULE BY MOUTH DAILY 90 Cap 3   • fluticasone (FLONASE) 50 MCG/ACT nasal spray Spray 1 Spray in nose every day. 16 g 11   • propranolol CR (INDERAL LA) 80 MG CAPSULE SR 24 HR Take 1 Cap by mouth every day. 90 Cap 1   • albuterol 108 (90 Base) MCG/ACT Aero Soln inhalation aerosol Inhale 2 Puffs by mouth every 6 hours as needed for Shortness of Breath. 8.5 g 1   •  "Multiple Vitamin (MULTI VITAMIN DAILY PO) Take  by mouth.     • Cholecalciferol (VITAMIN D PO) Take  by mouth.     • Magnesium 100 MG Cap Take  by mouth.     • Calcium Carbonate-Vitamin D (CALCIUM + D PO) Take  by mouth.     • Misc. Devices MISC New CPAP mask and supplies; dog chewed up mask; fax to [facility] Device 3     No current facility-administered medications for this visit.      She  has a past medical history of Fatigue (12/28/2010); GERD (gastroesophageal reflux disease) (12/28/2010); Hypertension; Hypoglycemia (12/28/2010); Irregular menses (12/28/2010); Lactose intolerance (12/28/2010); Menopause, premature (7/24/2012); Menorrhagia (1/18/2011); Nocturnal muscle spasm (12/28/2010); Primary snoring (12/28/2010); and Unspecified vitamin D deficiency (12/28/2010).    ROS  No chest pain, no shortness of breath, no abdominal pain       Objective:     /80 (BP Location: Left arm, Patient Position: Sitting, BP Cuff Size: Adult)   Pulse 84   Temp 36.9 °C (98.4 °F) (Temporal)   Resp 16   Ht 1.6 m (5' 3\")   Wt 100.7 kg (222 lb)   SpO2 96%  Body mass index is 39.33 kg/m².   Physical Exam:  Constitutional: Alert, no distress.  Skin: Warm, dry, good turgor, no rashes in visible areas.  Eye: Equal, round and reactive, conjunctiva clear, lids normal.  ENMT: Lips without lesions, good dentition, oropharynx clear.  Neck: Trachea midline, no masses, no thyromegaly. No cervical or supraclavicular lymphadenopathy  Respiratory: Unlabored respiratory effort, lungs clear to auscultation, no wheezes, no ronchi.  Cardiovascular: Normal S1, S2, no murmur, no edema.  Abdomen: Soft, non-tender, no masses, no hepatosplenomegaly.  Psych: Alert and oriented x3, normal affect and mood.        Assessment and Plan:   The following treatment plan was discussed    1. Breast cancer screening  - MA-SCREEN MAMMO W/CAD-BILAT; Future    2. Vitamin D deficiency  - VITAMIN D,25 HYDROXY; Future    3. Prediabetes  Encouraged diet " changes  - HEMOGLOBIN A1C; Future    4. Essential hypertension  - Comp Metabolic Panel; Future    5. Dyslipidemia  Encouraged diet changes  - Lipid Profile; Future    6. Mixed hyperlipidemia    7. Gastroesophageal reflux disease without esophagitis  Reflux precautions discussed  - REFERRAL TO GASTROENTEROLOGY    8. Epigastric pain  - H.PYLORI STOOL ANTIGEN; Future    9. Obesity (BMI 30-39.9)  - Patient identified as having weight management issue.  Appropriate orders and counseling given.      Followup: Return in about 6 months (around 1/2/2020) for GERD, review labs.

## 2019-07-12 LAB
25(OH)D3+25(OH)D2 SERPL-MCNC: 61.7 NG/ML (ref 30–100)
AMBIG ABBREV LP  977212: NORMAL
CHOLEST SERPL-MCNC: 227 MG/DL (ref 100–199)
HBA1C MFR BLD: 6 % (ref 4.8–5.6)
HDLC SERPL-MCNC: 49 MG/DL
LABORATORY COMMENT REPORT: ABNORMAL
LDLC SERPL CALC-MCNC: 133 MG/DL (ref 0–99)
TRIGL SERPL-MCNC: 224 MG/DL (ref 0–149)
VLDLC SERPL CALC-MCNC: 45 MG/DL (ref 5–40)

## 2019-07-12 RX ORDER — SIMVASTATIN 20 MG
20 TABLET ORAL EVERY EVENING
Qty: 90 TAB | Refills: 3 | Status: SHIPPED | OUTPATIENT
Start: 2019-07-12 | End: 2019-07-24 | Stop reason: SINTOL

## 2019-07-12 NOTE — PROGRESS NOTES
Please advise patient that I reviewed lab results. Cholesterol levels are still elevated, I've sent a Rx for a cho lowering medication simvastatin to [facility]. 1 tab every evening. This can lower her risk of heart attack and strokes.  Possible side effects: If she has muscle pain or weakness or dark urine she should stop the medication.  A1c is slightly higher so continue with avoiding sugars and starches in food.  Christian Milner M.D.

## 2019-07-15 LAB — H PYLORI AG STL QL IA: NEGATIVE

## 2019-07-17 ENCOUNTER — HOSPITAL ENCOUNTER (OUTPATIENT)
Dept: RADIOLOGY | Facility: MEDICAL CENTER | Age: 54
End: 2019-07-17
Attending: FAMILY MEDICINE
Payer: COMMERCIAL

## 2019-07-17 DIAGNOSIS — Z12.39 BREAST CANCER SCREENING: ICD-10-CM

## 2019-07-17 PROCEDURE — 77063 BREAST TOMOSYNTHESIS BI: CPT

## 2019-07-17 RX ORDER — ONDANSETRON 4 MG/1
4 TABLET, FILM COATED ORAL EVERY 4 HOURS PRN
Qty: 20 TAB | Refills: 0 | Status: SHIPPED | OUTPATIENT
Start: 2019-07-17 | End: 2019-08-26 | Stop reason: SDUPTHER

## 2019-07-24 ENCOUNTER — TELEPHONE (OUTPATIENT)
Dept: MEDICAL GROUP | Facility: PHYSICIAN GROUP | Age: 54
End: 2019-07-24

## 2019-07-24 RX ORDER — ROSUVASTATIN CALCIUM 10 MG/1
10 TABLET, COATED ORAL EVERY EVENING
Qty: 30 TAB | Refills: 11 | Status: SHIPPED | OUTPATIENT
Start: 2019-07-24 | End: 2020-08-14 | Stop reason: SDUPTHER

## 2019-07-24 NOTE — TELEPHONE ENCOUNTER
VOICEMAIL  1. Caller Name: arabella                      Call Back Number: [phone]    2. Message: patient called stating that she has been having joint pain and constipation since starting the simvastatin. Can we switch to a different statin?    3. Patient approves office to leave a detailed voicemail/MyChart message: N\A

## 2019-07-24 NOTE — TELEPHONE ENCOUNTER
Phone Number Called: [phone] (home)       Call outcome: spoke to patient regarding message below    Message: patient advised and says thank you.

## 2019-08-02 NOTE — RESULT ENCOUNTER NOTE
Larisa  Your mammogram was normal! Next is due in one year.   Please let me know immediately if you notice any new lumps/rashes/pain/nipple discharge.  Christian Milner M.D.

## 2019-08-28 RX ORDER — ONDANSETRON 4 MG/1
TABLET, FILM COATED ORAL
Qty: 20 TAB | Refills: 0 | Status: SHIPPED | OUTPATIENT
Start: 2019-08-28 | End: 2019-09-29 | Stop reason: SDUPTHER

## 2019-08-28 NOTE — TELEPHONE ENCOUNTER
Was the patient seen in the last year in this department? Yes    Does patient have an active prescription for medications requested? No     Received Request Via: Pharmacy      Pt met protocol?: Yes    OV 7/19

## 2019-09-26 DIAGNOSIS — G89.29 CHRONIC MIDLINE LOW BACK PAIN WITHOUT SCIATICA: ICD-10-CM

## 2019-09-26 DIAGNOSIS — M54.50 CHRONIC MIDLINE LOW BACK PAIN WITHOUT SCIATICA: ICD-10-CM

## 2019-09-26 DIAGNOSIS — I10 ESSENTIAL HYPERTENSION: ICD-10-CM

## 2019-09-30 DIAGNOSIS — M54.50 CHRONIC MIDLINE LOW BACK PAIN WITHOUT SCIATICA: ICD-10-CM

## 2019-09-30 DIAGNOSIS — I10 ESSENTIAL HYPERTENSION: ICD-10-CM

## 2019-09-30 DIAGNOSIS — G89.29 CHRONIC MIDLINE LOW BACK PAIN WITHOUT SCIATICA: ICD-10-CM

## 2019-09-30 RX ORDER — DICLOFENAC SODIUM 75 MG/1
TABLET, DELAYED RELEASE ORAL
Qty: 60 TAB | Refills: 0 | Status: SHIPPED | OUTPATIENT
Start: 2019-09-30 | End: 2019-09-30 | Stop reason: SDUPTHER

## 2019-09-30 RX ORDER — PROPRANOLOL HYDROCHLORIDE 80 MG/1
80 CAPSULE, EXTENDED RELEASE ORAL
Qty: 90 CAP | Refills: 0 | Status: SHIPPED
Start: 2019-09-30 | End: 2020-01-21

## 2019-09-30 RX ORDER — PROPRANOLOL HYDROCHLORIDE 80 MG/1
CAPSULE, EXTENDED RELEASE ORAL
Qty: 90 CAP | Refills: 0 | Status: SHIPPED | OUTPATIENT
Start: 2019-09-30 | End: 2019-09-30 | Stop reason: SDUPTHER

## 2019-09-30 RX ORDER — DICLOFENAC SODIUM 75 MG/1
75 TABLET, DELAYED RELEASE ORAL 2 TIMES DAILY PRN
Qty: 60 TAB | Refills: 0 | Status: SHIPPED
Start: 2019-09-30 | End: 2020-01-21 | Stop reason: SDUPTHER

## 2019-09-30 NOTE — TELEPHONE ENCOUNTER
Was the patient seen in the last year in this department? Yes    Does patient have an active prescription for medications requested? No     Received Request Via: Patient     There was an order placed today to go to [facility], but patient needs this to go to [facility].

## 2019-10-02 ENCOUNTER — TELEPHONE (OUTPATIENT)
Dept: MEDICAL GROUP | Facility: PHYSICIAN GROUP | Age: 54
End: 2019-10-02

## 2019-10-02 NOTE — TELEPHONE ENCOUNTER
Phone Number Called: [phone]    Call outcome: Spoke to [facility]    Message: Pharmacy states that they did not receive prescription-Called in patient's:  propranolol CR (INDERAL LA) 80 MG CAPSULE SR 24 HR 90 Cap 0/0 9/30/2019     Sig - Route: Take 1 Cap by mouth every day. - Oral

## 2019-10-02 NOTE — TELEPHONE ENCOUNTER
Phone Number Called: [phone]    Call outcome: [facility]    Message: Called in patient's medication to the Pharmacy Line:  diclofenac EC (VOLTAREN) 75 MG Tablet Delayed Response

## 2019-10-09 DIAGNOSIS — Z00.6 RESEARCH STUDY PATIENT: ICD-10-CM

## 2019-10-09 NOTE — PROGRESS NOTES
[facility] [place] Cardiac Calcium CT Trial:  Ms. Davis contacted via telephone by Lucina Hernandez, study team member for enrollment in the above stated trial. Informed consent form reviewed, questions answered.  Consent signed via HelloSign by Ms. Davis on 10/8/2019 and witnessed by Lucina Hernandez on 10/8/2019.  Order for CT scan placed in [facility].

## 2019-10-16 ENCOUNTER — APPOINTMENT (OUTPATIENT)
Dept: RADIOLOGY | Facility: MEDICAL CENTER | Age: 54
End: 2019-10-16
Attending: INTERNAL MEDICINE

## 2019-11-05 DIAGNOSIS — M79.673 PAIN OF FOOT, UNSPECIFIED LATERALITY: ICD-10-CM

## 2019-11-05 NOTE — PROGRESS NOTES
Requested Prescriptions     Pending Prescriptions Disp Refills    LORazepam (ATIVAN) 2 mg tablet 180 Tab 0     Sig: Take 2 tablets at bedtime       Last Refill: 6/11/18  Next Appointment:11/19/18 ref

## 2019-11-07 RX ORDER — ONDANSETRON 4 MG/1
TABLET, FILM COATED ORAL
Qty: 20 TAB | Refills: 0 | Status: SHIPPED | OUTPATIENT
Start: 2019-11-07 | End: 2023-08-17 | Stop reason: SDUPTHER

## 2019-11-08 ENCOUNTER — APPOINTMENT (OUTPATIENT)
Dept: RADIOLOGY | Facility: MEDICAL CENTER | Age: 54
End: 2019-11-08
Attending: INTERNAL MEDICINE
Payer: COMMERCIAL

## 2019-11-08 ENCOUNTER — HOSPITAL ENCOUNTER (OUTPATIENT)
Dept: RADIOLOGY | Facility: MEDICAL CENTER | Age: 54
End: 2019-11-08
Attending: INTERNAL MEDICINE

## 2019-11-08 DIAGNOSIS — Z00.6 RESEARCH STUDY PATIENT: ICD-10-CM

## 2019-11-08 PROCEDURE — 4410556 CT-CARDIAC SCORING

## 2019-11-13 DIAGNOSIS — M79.671 PAIN OF RIGHT HEEL: ICD-10-CM

## 2019-11-26 ENCOUNTER — TELEPHONE (OUTPATIENT)
Dept: CARDIOLOGY | Facility: MEDICAL CENTER | Age: 54
End: 2019-11-26

## 2019-11-26 NOTE — TELEPHONE ENCOUNTER
[facility] [place] Cardiac Calcium Scoring CT study:  Spoke with Ms. Davis today, 11-26-19 to discuss results of CT Cardiac Calcium study as reviewed by Dr. Matson.    Findings:   Calcium score: 0   Please see complete report in Imaging.      IMPRESSION: No coronary artery plaque identified.  Consider repeat testing in 5 years.  No indication for medical therapy    Encouraged to follow up with PCP.    Encouraged to contact study team with any questions.

## 2019-12-05 PROBLEM — Z00.6 RESEARCH STUDY PATIENT: Status: RESOLVED | Noted: 2019-10-09 | Resolved: 2019-12-05

## 2020-01-14 RX ORDER — ALPRAZOLAM 0.5 MG/1
TABLET ORAL
Qty: 30 TAB | OUTPATIENT
Start: 2020-01-14

## 2020-01-18 ENCOUNTER — PATIENT MESSAGE (OUTPATIENT)
Dept: MEDICAL GROUP | Facility: PHYSICIAN GROUP | Age: 55
End: 2020-01-18

## 2020-01-19 NOTE — TELEPHONE ENCOUNTER
From: Larisa Davis  To: Christian Milner M.D.  Sent: 1/18/2020 8:31 AM PST  Subject: Prescription Question    Hi I am out of my anxiety prescription and it's not on the list to refill. I thought I had one more refill left from when you were out. 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   Thanks.

## 2020-01-21 ENCOUNTER — OFFICE VISIT (OUTPATIENT)
Dept: MEDICAL GROUP | Facility: PHYSICIAN GROUP | Age: 55
End: 2020-01-21
Payer: COMMERCIAL

## 2020-01-21 VITALS
WEIGHT: 224 LBS | BODY MASS INDEX: 41.22 KG/M2 | HEIGHT: 62 IN | RESPIRATION RATE: 16 BRPM | HEART RATE: 86 BPM | OXYGEN SATURATION: 96 % | SYSTOLIC BLOOD PRESSURE: 112 MMHG | TEMPERATURE: 99.8 F | DIASTOLIC BLOOD PRESSURE: 78 MMHG

## 2020-01-21 DIAGNOSIS — M54.50 CHRONIC MIDLINE LOW BACK PAIN WITHOUT SCIATICA: ICD-10-CM

## 2020-01-21 DIAGNOSIS — J34.89 LESION OF NOSE: ICD-10-CM

## 2020-01-21 DIAGNOSIS — E78.5 DYSLIPIDEMIA: ICD-10-CM

## 2020-01-21 DIAGNOSIS — M77.30 CALCANEAL SPUR, UNSPECIFIED LATERALITY: ICD-10-CM

## 2020-01-21 DIAGNOSIS — Z79.899 CHRONIC PRESCRIPTION BENZODIAZEPINE USE: ICD-10-CM

## 2020-01-21 DIAGNOSIS — E66.01 MORBID OBESITY (HCC): ICD-10-CM

## 2020-01-21 DIAGNOSIS — G89.29 CHRONIC MIDLINE LOW BACK PAIN WITHOUT SCIATICA: ICD-10-CM

## 2020-01-21 DIAGNOSIS — R05.9 COUGH: ICD-10-CM

## 2020-01-21 DIAGNOSIS — E78.2 MIXED HYPERLIPIDEMIA: ICD-10-CM

## 2020-01-21 DIAGNOSIS — R73.03 PREDIABETES: ICD-10-CM

## 2020-01-21 DIAGNOSIS — I10 ESSENTIAL HYPERTENSION: ICD-10-CM

## 2020-01-21 DIAGNOSIS — G47.00 INSOMNIA, UNSPECIFIED TYPE: ICD-10-CM

## 2020-01-21 DIAGNOSIS — F41.9 ANXIETY: ICD-10-CM

## 2020-01-21 DIAGNOSIS — Z23 NEED FOR VACCINATION: ICD-10-CM

## 2020-01-21 PROBLEM — E66.9 OBESITY (BMI 35.0-39.9 WITHOUT COMORBIDITY): Status: RESOLVED | Noted: 2017-06-21 | Resolved: 2020-01-21

## 2020-01-21 PROBLEM — E66.9 OBESITY (BMI 30-39.9): Status: RESOLVED | Noted: 2019-07-02 | Resolved: 2020-01-21

## 2020-01-21 PROCEDURE — 99214 OFFICE O/P EST MOD 30 MIN: CPT | Mod: 25 | Performed by: FAMILY MEDICINE

## 2020-01-21 PROCEDURE — 90686 IIV4 VACC NO PRSV 0.5 ML IM: CPT | Performed by: FAMILY MEDICINE

## 2020-01-21 PROCEDURE — 90471 IMMUNIZATION ADMIN: CPT | Performed by: FAMILY MEDICINE

## 2020-01-21 RX ORDER — METHYLPREDNISOLONE 4 MG/1
TABLET ORAL
COMMUNITY
Start: 2019-12-23 | End: 2020-01-21

## 2020-01-21 RX ORDER — ALBUTEROL SULFATE 90 UG/1
2 AEROSOL, METERED RESPIRATORY (INHALATION) EVERY 4 HOURS PRN
Qty: 1 INHALER | Refills: 0 | Status: SHIPPED | OUTPATIENT
Start: 2020-01-21 | End: 2020-01-22 | Stop reason: SDUPTHER

## 2020-01-21 RX ORDER — HYDROXYZINE HYDROCHLORIDE 25 MG/1
25 TABLET, FILM COATED ORAL 3 TIMES DAILY PRN
Qty: 90 TAB | Refills: 0 | Status: SHIPPED | OUTPATIENT
Start: 2020-01-21 | End: 2021-01-26

## 2020-01-21 RX ORDER — PROPRANOLOL HYDROCHLORIDE 80 MG/1
80 CAPSULE, EXTENDED RELEASE ORAL
Qty: 90 CAP | Refills: 3 | Status: SHIPPED | OUTPATIENT
Start: 2020-01-21 | End: 2020-03-19 | Stop reason: SDUPTHER

## 2020-01-21 RX ORDER — PROMETHAZINE HYDROCHLORIDE AND CODEINE PHOSPHATE 6.25; 1 MG/5ML; MG/5ML
5 SYRUP ORAL NIGHTLY PRN
Qty: 60 ML | Refills: 0 | Status: SHIPPED | OUTPATIENT
Start: 2020-01-21 | End: 2020-02-04

## 2020-01-21 RX ORDER — DICLOFENAC SODIUM 75 MG/1
75 TABLET, DELAYED RELEASE ORAL 2 TIMES DAILY PRN
Qty: 60 TAB | Refills: 3 | Status: SHIPPED | OUTPATIENT
Start: 2020-01-21 | End: 2023-06-21 | Stop reason: SDUPTHER

## 2020-01-21 RX ORDER — CYCLOBENZAPRINE HCL 5 MG
5 TABLET ORAL 2 TIMES DAILY PRN
Qty: 60 TAB | Refills: 3 | Status: SHIPPED | OUTPATIENT
Start: 2020-01-21 | End: 2021-01-26

## 2020-01-21 RX ORDER — ROSUVASTATIN CALCIUM 10 MG/1
TABLET, COATED ORAL
COMMUNITY
Start: 2020-01-10 | End: 2020-01-21

## 2020-01-21 ASSESSMENT — PATIENT HEALTH QUESTIONNAIRE - PHQ9: CLINICAL INTERPRETATION OF PHQ2 SCORE: 0

## 2020-01-21 NOTE — PROGRESS NOTES
Chief Complaint   Patient presents with   • Anxiety     [facility] med refill   • Cough     X sun   • Nasal Congestion     x sun        HPI: Patient is a 55 y.o. female complaining of 3 days of illness including: nonproductive cough, sore throat.   Mucus is: thin.  Similarly ill exposures: yes.  Treatments tried: mucinex, nasal saline  She  reports that she quit smoking about 31 years ago. She has a 10.00 pack-year smoking history. She has never used smokeless tobacco..     ROS:  No fever, cough, nausea, changes in bowel movements or skin rash.      I reviewed the patient's medications, allergies and medical history:  Current Outpatient Medications   Medication Sig Dispense Refill   • albuterol 108 (90 Base) MCG/ACT Aero Soln inhalation aerosol Inhale 2 Puffs by mouth every four hours as needed for Shortness of Breath. 1 Inhaler 0   • promethazine-codeine (PHENERGAN-CODEINE) 6.25-10 MG/5ML Syrup Take 5 mL by mouth at bedtime as needed for Cough for up to 14 days. Do not drink or drive while on this medication 60 mL 0   • ondansetron (ZOFRAN) 4 MG Tab tablet Take 1 tablt by mouth every 4 hours as needed for nausea or vomiting 20 Tab 0   • diclofenac EC (VOLTAREN) 75 MG Tablet Delayed Response Take 1 Tab by mouth 2 times a day as needed. PAIN 60 Tab 0   • rosuvastatin (CRESTOR) 10 MG Tab Take 1 Tab by mouth every evening. 30 Tab 11   • cyclobenzaprine (FLEXERIL) 5 MG tablet TAKE 1 TABLET BY MOUTH TWICE DAILY AS NEEDED 60 Tab 0   • Omega-3 Fatty Acids (FISH OIL PO) Take  by mouth.     • omeprazole (PRILOSEC) 40 MG delayed-release capsule TAKE 1 CAPSULE BY MOUTH DAILY 90 Cap 3   • fluticasone (FLONASE) 50 MCG/ACT nasal spray Spray 1 Spray in nose every day. 16 g 11   • albuterol 108 (90 Base) MCG/ACT Aero Soln inhalation aerosol Inhale 2 Puffs by mouth every 6 hours as needed for Shortness of Breath. 8.5 g 1   • Multiple Vitamin (MULTI VITAMIN DAILY PO) Take  by mouth.     • Cholecalciferol (VITAMIN D PO) Take  by mouth.    "  • Magnesium 100 MG Cap Take  by mouth.     • Calcium Carbonate-Vitamin D (CALCIUM + D PO) Take  by mouth.     • Misc. Devices MISC New CPAP mask and supplies; dog chewed up mask; fax to [facility] Device 3   • rosuvastatin (CRESTOR) 10 MG Tab      • methylPREDNISolone (MEDROL DOSEPAK) 4 MG Tablet Therapy Pack FPD       No current facility-administered medications for this visit.      Pcn [penicillins]  Past Medical History:   Diagnosis Date   • Fatigue 12/28/2010   • GERD (gastroesophageal reflux disease) 12/28/2010   • Hypertension    • Hypoglycemia 12/28/2010   • Irregular menses 12/28/2010   • Lactose intolerance 12/28/2010   • Menopause, premature 7/24/2012   • Menorrhagia 1/18/2011   • Nocturnal muscle spasm 12/28/2010   • Primary snoring 12/28/2010   • Unspecified vitamin D deficiency 12/28/2010        EXAM:  /78 (BP Location: Right arm, Patient Position: Sitting, BP Cuff Size: Adult)   Pulse 86   Temp 37.7 °C (99.8 °F) (Temporal)   Resp 16   Ht 1.575 m (5' 2\")   Wt 101.6 kg (224 lb)   SpO2 96%   General: Alert, no conversational dyspnea or audible wheeze, non-toxic appearance.  Eyes: PERRL, conjunctiva slightly injected, no eye discharge.  Ears: Normal pinnae,TM's normal bilaterally.  Nares: Patent with thin mucus.  Sinuses: non tender over maxillary / frontal sinuses.  Throat: Erythematous injection without exudate.   Neck: Supple, with no adenopathy.  Lungs: Clear to auscultation bilaterally, no wheeze, crackles or rhonchi.   Heart: Regular rate without murmur.  Skin: Warm and dry without rash.     ASSESSMENT:   1. Need for vaccination    2. Chronic prescription benzodiazepine use    3. Anxiety    4. Insomnia, unspecified type    5. Cough          PLAN:  1. Educated patient that majority of upper respiratory infections are viral and do not need antibiotics.  2. Twice daily use of nasal saline rinse or Neti-Pot.  3. OTC anti-pyretics and decongestants as needed.  4. Follow-up in office or urgent " care for worsening symptoms, difficulty breathing, lack of expected recovery, or should new symptoms or problems arise.

## 2020-01-21 NOTE — ASSESSMENT & PLAN NOTE
Myalgia with simvastatin  Now tolerating rosuvastatin 10 mg  Repeat fasting labs ordered.   For elevated TG advised to avoid junk foods, soda, juices, chips. She does not do a lot of junk foods.

## 2020-01-22 PROBLEM — E66.01 MORBID OBESITY (HCC): Status: ACTIVE | Noted: 2020-01-22

## 2020-01-22 NOTE — ASSESSMENT & PLAN NOTE
Due to work stress she occasionally has high anxiety days.   I suggested hydroxyzine and prescription provided. Patient has used alprazolam in the past for this. If patient in the future wants refills of the benzodiazepine she needs to sign a controlled substance agreement, see a therapist for coping skills and maybe consider an SSRI.

## 2020-01-22 NOTE — PROGRESS NOTES
Subjective:   Larisa Davis is a 55 y.o. female here today for evaluation and management of:     Mixed hyperlipidemia  Myalgia with simvastatin  Now tolerating rosuvastatin 10 mg  Repeat fasting labs ordered.   For elevated TG advised to avoid junk foods, soda, juices, chips. She does not do a lot of junk foods.       HTN (hypertension)  Chronic condition, controlled on propranolol 80 mg qhs   No chest pain, LE edema, dyspnea on exertion.     Anxiety  Due to work stress she occasionally has high anxiety days.   I suggested hydroxyzine and prescription provided. Patient has used alprazolam in the past for this. If patient in the future wants refills of the benzodiazepine she needs to sign a controlled substance agreement, see a therapist for coping skills and maybe consider an SSRI.       Morbid obesity (HCC)  Patient has struggled with obesity for a long time.   She now has prediabetes and HTN and elevated triglycerides and fatty liver.   I encouraged her to continue working on diet changes, portion sizes, less starchy and sweet foods, more vegetables, some fruit, lean protein like fish.   I also suggested cautiously to her to consider bariatric surgery and she said her daughter had it done with a great outcome. I told her the best way to lose weight is to make diet and activity changes and if she considers surgery that there are potential risks.   Referral done so she can consult with the surgeon and get more information to see if this is a reasonable option for her.          Current medicines (including changes today)  Current Outpatient Medications   Medication Sig Dispense Refill   • albuterol 108 (90 Base) MCG/ACT Aero Soln inhalation aerosol Inhale 2 Puffs by mouth every four hours as needed for Shortness of Breath. 1 Inhaler 0   • promethazine-codeine (PHENERGAN-CODEINE) 6.25-10 MG/5ML Syrup Take 5 mL by mouth at bedtime as needed for Cough for up to 14 days. Do not drink or drive while on this  medication 60 mL 0   • propranolol CR (INDERAL LA) 80 MG CAPSULE SR 24 HR Take 1 Cap by mouth every day. 90 Cap 3   • hydrOXYzine HCl (ATARAX) 25 MG Tab Take 1 Tab by mouth 3 times a day as needed for Anxiety. 90 Tab 0   • cyclobenzaprine (FLEXERIL) 5 MG tablet Take 1 Tab by mouth 2 times a day as needed. 60 Tab 3   • diclofenac EC (VOLTAREN) 75 MG Tablet Delayed Response Take 1 Tab by mouth 2 times a day as needed. PAIN 60 Tab 3   • ondansetron (ZOFRAN) 4 MG Tab tablet Take 1 tablt by mouth every 4 hours as needed for nausea or vomiting 20 Tab 0   • rosuvastatin (CRESTOR) 10 MG Tab Take 1 Tab by mouth every evening. 30 Tab 11   • Omega-3 Fatty Acids (FISH OIL PO) Take  by mouth.     • omeprazole (PRILOSEC) 40 MG delayed-release capsule TAKE 1 CAPSULE BY MOUTH DAILY 90 Cap 3   • fluticasone (FLONASE) 50 MCG/ACT nasal spray Spray 1 Spray in nose every day. 16 g 11   • albuterol 108 (90 Base) MCG/ACT Aero Soln inhalation aerosol Inhale 2 Puffs by mouth every 6 hours as needed for Shortness of Breath. 8.5 g 1   • Multiple Vitamin (MULTI VITAMIN DAILY PO) Take  by mouth.     • Cholecalciferol (VITAMIN D PO) Take  by mouth.     • Magnesium 100 MG Cap Take  by mouth.     • Calcium Carbonate-Vitamin D (CALCIUM + D PO) Take  by mouth.     • Misc. Devices MISC New CPAP mask and supplies; dog chewed up mask; fax to [facility] 1 Device 3     No current facility-administered medications for this visit.      She  has a past medical history of Fatigue (12/28/2010), GERD (gastroesophageal reflux disease) (12/28/2010), Hypertension, Hypoglycemia (12/28/2010), Irregular menses (12/28/2010), Lactose intolerance (12/28/2010), Menopause, premature (7/24/2012), Menorrhagia (1/18/2011), Nocturnal muscle spasm (12/28/2010), Primary snoring (12/28/2010), and Unspecified vitamin D deficiency (12/28/2010).    ROS  No chest pain, no shortness of breath, no abdominal pain       Objective:     /78 (BP Location: Right arm, Patient Position:  "Sitting, BP Cuff Size: Adult)   Pulse 86   Temp 37.7 °C (99.8 °F) (Temporal)   Resp 16   Ht 1.575 m (5' 2\")   Wt 101.6 kg (224 lb)   SpO2 96%  Body mass index is 40.97 kg/m².   Physical Exam:  Constitutional: Alert, no distress.  Skin: Warm, dry, good turgor, no rashes in visible areas. Small pale colored nodule on tip of nose.   Eye: Equal, round and reactive, conjunctiva clear, lids normal.  ENMT: Lips without lesions, good dentition, oropharynx clear.  Neck: Trachea midline, no masses, no thyromegaly. No cervical or supraclavicular lymphadenopathy  Respiratory: Unlabored respiratory effort, lungs clear to auscultation, no wheezes, no ronchi.  Cardiovascular: Normal S1, S2, no murmur, no edema.  Abdomen: Soft, non-tender, no masses, no hepatosplenomegaly.  Psych: Alert and oriented x3, normal affect and mood.        Assessment and Plan:   The following treatment plan was discussed    1. Need for vaccination  - Influenza Vaccine Quad Injection (PF)    2. Anxiety  rx for hydroxyzine provided.   - hydrOXYzine HCl (ATARAX) 25 MG Tab; Take 1 Tab by mouth 3 times a day as needed for Anxiety.  Dispense: 90 Tab; Refill: 0    3. Cough  - promethazine-codeine (PHENERGAN-CODEINE) 6.25-10 MG/5ML Syrup; Take 5 mL by mouth at bedtime as needed for Cough for up to 14 days. Do not drink or drive while on this medication  Dispense: 60 mL; Refill: 0    4. Mixed hyperlipidemia  Advised diet changes, continue rosuvastatin 10 mg  Continue statin.   - REFERRAL TO BARIATRIC SURGERY  - Comp Metabolic Panel; Future  - Lipid Profile; Future  5. Essential hypertension  Controlled with propranolol  - REFERRAL TO BARIATRIC SURGERY  - propranolol CR (INDERAL LA) 80 MG CAPSULE SR 24 HR; Take 1 Cap by mouth every day.  Dispense: 90 Cap; Refill: 3    6. Morbid obesity (HCC)  - REFERRAL TO BARIATRIC SURGERY    7. Calcaneal spur, unspecified laterality  - REFERRAL TO BARIATRIC SURGERY    8. Lesion of nose  - REFERRAL TO DERMATOLOGY    9. " Chronic midline low back pain without sciatica  - cyclobenzaprine (FLEXERIL) 5 MG tablet; Take 1 Tab by mouth 2 times a day as needed.  Dispense: 60 Tab; Refill: 3  - diclofenac EC (VOLTAREN) 75 MG Tablet Delayed Response; Take 1 Tab by mouth 2 times a day as needed. PAIN  Dispense: 60 Tab; Refill: 3      Followup: Return in about 6 months (around 7/21/2020) for high TG, dyslipid, .

## 2020-01-22 NOTE — ASSESSMENT & PLAN NOTE
Patient has struggled with obesity for a long time.   She now has prediabetes and HTN and elevated triglycerides and fatty liver.   I encouraged her to continue working on diet changes, portion sizes, less starchy and sweet foods, more vegetables, some fruit, lean protein like fish.   I also suggested cautiously to her to consider bariatric surgery and she said her daughter had it done with a great outcome. I told her the best way to lose weight is to make diet and activity changes and if she considers surgery that there are potential risks.   Referral done so she can consult with the surgeon and get more information to see if this is a reasonable option for her.

## 2020-01-22 NOTE — ASSESSMENT & PLAN NOTE
Chronic condition, controlled on propranolol 80 mg qhs   No chest pain, LE edema, dyspnea on exertion.

## 2020-02-06 RX ORDER — AZITHROMYCIN 250 MG/1
TABLET, FILM COATED ORAL
Qty: 6 TAB | Refills: 0 | Status: SHIPPED | OUTPATIENT
Start: 2020-02-06 | End: 2021-01-26

## 2020-02-25 ENCOUNTER — OFFICE VISIT (OUTPATIENT)
Dept: URGENT CARE | Facility: PHYSICIAN GROUP | Age: 55
End: 2020-02-25
Payer: COMMERCIAL

## 2020-02-25 ENCOUNTER — APPOINTMENT (OUTPATIENT)
Dept: RADIOLOGY | Facility: IMAGING CENTER | Age: 55
End: 2020-02-25
Attending: PHYSICIAN ASSISTANT
Payer: COMMERCIAL

## 2020-02-25 VITALS
DIASTOLIC BLOOD PRESSURE: 88 MMHG | HEART RATE: 90 BPM | OXYGEN SATURATION: 95 % | HEIGHT: 62 IN | RESPIRATION RATE: 16 BRPM | SYSTOLIC BLOOD PRESSURE: 116 MMHG | TEMPERATURE: 97.2 F | BODY MASS INDEX: 41.04 KG/M2 | WEIGHT: 223 LBS

## 2020-02-25 DIAGNOSIS — R06.02 SOB (SHORTNESS OF BREATH): ICD-10-CM

## 2020-02-25 DIAGNOSIS — R50.9 FEVER, UNSPECIFIED FEVER CAUSE: ICD-10-CM

## 2020-02-25 DIAGNOSIS — J22 LOWER RESP. TRACT INFECTION: ICD-10-CM

## 2020-02-25 PROCEDURE — 99214 OFFICE O/P EST MOD 30 MIN: CPT | Performed by: PHYSICIAN ASSISTANT

## 2020-02-25 PROCEDURE — 71046 X-RAY EXAM CHEST 2 VIEWS: CPT | Mod: TC,FY | Performed by: PHYSICIAN ASSISTANT

## 2020-02-25 RX ORDER — DOXYCYCLINE HYCLATE 100 MG
100 TABLET ORAL 2 TIMES DAILY
Qty: 14 TAB | Refills: 0 | Status: SHIPPED | OUTPATIENT
Start: 2020-02-25 | End: 2021-01-26

## 2020-02-25 RX ORDER — PREDNISONE 10 MG/1
TABLET ORAL
Qty: 1 QUANTITY SUFFICIENT | Refills: 0 | Status: SHIPPED | OUTPATIENT
Start: 2020-02-25 | End: 2021-01-26

## 2020-02-25 RX ORDER — BENZONATATE 200 MG/1
200 CAPSULE ORAL 3 TIMES DAILY PRN
Qty: 30 CAP | Refills: 0 | Status: SHIPPED | OUTPATIENT
Start: 2020-02-25 | End: 2021-01-26

## 2020-02-25 NOTE — PROGRESS NOTES
Chief Complaint   Patient presents with   • Cough     was seen for the same thing a month ago and the cough has returned        HISTORY OF PRESENT ILLNESS: Patient is a 55 y.o. female who presents today for the following:    Cough x 7-10 days  + SOB, light headed, fever (off and on), body aches, chills, fatigue  No h/o asthma but uses an inhaler at times  Not sleeping due to cough  Had cough syrup with codeine but it was not helping    Patient Active Problem List    Diagnosis Date Noted   • Morbid obesity (HCC) 01/22/2020   • Mixed hyperlipidemia 07/02/2019   • Chronic midline low back pain without sciatica 03/08/2019   • Irritable bowel syndrome with both constipation and diarrhea 06/21/2017   • Enlarged liver 08/06/2015   • Prediabetes 07/31/2015   • Sleep apnea 07/02/2015   • HTN (hypertension) 12/15/2014   • Hot flashes 12/15/2014   • Anxiety 10/08/2013   • Undiagnosed cardiac murmurs 04/30/2013   • Heart palpitations 04/09/2013   • Menopause, premature 07/24/2012   • Obese 07/24/2012   • GERD (gastroesophageal reflux disease) 12/28/2010   • Vitamin D deficiency 12/28/2010   • Fatigue 12/28/2010   • Lactose intolerance 12/28/2010       Allergies:Pcn [penicillins]    Current Outpatient Medications Ordered in Epic   Medication Sig Dispense Refill   • Hydrocod Polst-CPM Polst ER (TUSSIONEX PENNKINETIC ER) 10-8 MG/5ML Suspension Extended Release Take 5 mL by mouth every 12 hours for 10 days. 100 mL 0   • predniSONE (DELTASONE) 10 MG Tab 50 mg x 1 day; 40 mg x 1 day; 30 mg x 1 day; 20 mg x 1 day; 10 mg x 1 day 1 Quantity Sufficient 0   • doxycycline (VIBRAMYCIN) 100 MG Tab Take 1 Tab by mouth 2 times a day. 14 Tab 0   • benzonatate (TESSALON) 200 MG capsule Take 1 Cap by mouth 3 times a day as needed for Cough. 30 Cap 0   • azithromycin (ZITHROMAX) 250 MG Tab Take 2 tablets by mouth day one then one tablet daily day 2-5 6 Tab 0   • albuterol 108 (90 Base) MCG/ACT Aero Soln inhalation aerosol Inhale 2 Puffs by mouth  every four hours as needed for Shortness of Breath. 1 Inhaler 5   • propranolol CR (INDERAL LA) 80 MG CAPSULE SR 24 HR Take 1 Cap by mouth every day. 90 Cap 3   • hydrOXYzine HCl (ATARAX) 25 MG Tab Take 1 Tab by mouth 3 times a day as needed for Anxiety. 90 Tab 0   • cyclobenzaprine (FLEXERIL) 5 MG tablet Take 1 Tab by mouth 2 times a day as needed. 60 Tab 3   • diclofenac EC (VOLTAREN) 75 MG Tablet Delayed Response Take 1 Tab by mouth 2 times a day as needed. PAIN 60 Tab 3   • ondansetron (ZOFRAN) 4 MG Tab tablet Take 1 tablt by mouth every 4 hours as needed for nausea or vomiting 20 Tab 0   • rosuvastatin (CRESTOR) 10 MG Tab Take 1 Tab by mouth every evening. 30 Tab 11   • Omega-3 Fatty Acids (FISH OIL PO) Take  by mouth.     • omeprazole (PRILOSEC) 40 MG delayed-release capsule TAKE 1 CAPSULE BY MOUTH DAILY 90 Cap 3   • fluticasone (FLONASE) 50 MCG/ACT nasal spray Spray 1 Spray in nose every day. 16 g 11   • albuterol 108 (90 Base) MCG/ACT Aero Soln inhalation aerosol Inhale 2 Puffs by mouth every 6 hours as needed for Shortness of Breath. 8.5 g 1   • Multiple Vitamin (MULTI VITAMIN DAILY PO) Take  by mouth.     • Cholecalciferol (VITAMIN D PO) Take  by mouth.     • Magnesium 100 MG Cap Take  by mouth.     • Calcium Carbonate-Vitamin D (CALCIUM + D PO) Take  by mouth.     • Misc. Devices MISC New CPAP mask and supplies; dog chewed up mask; fax to [facility] 1 Device 3     No current Epic-ordered facility-administered medications on file.        Past Medical History:   Diagnosis Date   • Fatigue 12/28/2010   • GERD (gastroesophageal reflux disease) 12/28/2010   • Hypertension    • Hypoglycemia 12/28/2010   • Irregular menses 12/28/2010   • Lactose intolerance 12/28/2010   • Menopause, premature 7/24/2012   • Menorrhagia 1/18/2011   • Nocturnal muscle spasm 12/28/2010   • Primary snoring 12/28/2010   • Unspecified vitamin D deficiency 12/28/2010       Social History     Tobacco Use   • Smoking status: Former Smoker     " Packs/day: 1.00     Years: 10.00     Pack years: 10.00     Last attempt to quit: 1989     Years since quittin.1   • Smokeless tobacco: Never Used   Substance Use Topics   • Alcohol use: Yes     Alcohol/week: 7.0 oz     Types: 14 Glasses of wine per week   • Drug use: No       Family Status   Relation Name Status   • Mo  Alive        76 in    • Fa   at age 30        suicide   • MGFa   at age 93        colon cancer     Family History   Problem Relation Age of Onset   • Non-contributory Mother         retinal detachment   • Cancer Maternal Grandfather 92        colon cancer       Review of Systems:  Constitutional ROS: No unexpected change in weight, No weakness, No fatigue  Eye ROS: No recent significant change in vision, No eye pain, redness, discharge  Ear ROS: No drainage, No tinnitus or vertigo, No recent change in hearing  Mouth/Throat ROS: No teeth or gum problems, No bleeding gums, No tongue complaints  Neck ROS: No swollen glands, No significant pain in neck  Pulmonary ROS: Positive for shortness of breath.  Cardiovascular ROS: No diaphoresis, No edema, No palpitations  Musculoskeletal/Extremities ROS: No peripheral edema, No pain, redness or swelling on the joints  Hematologic/Lymphatic ROS: Positive for fever, chills, sweats.  Skin/Integumentary ROS: No edema, No evidence of rash, No itching      Exam:  /88   Pulse 90   Temp 36.2 °C (97.2 °F)   Resp 16   Ht 1.575 m (5' 2\")   Wt 101.2 kg (223 lb)   SpO2 95%   General: Well developed, well nourished. No distress.    Eye: PERRL/EOMI; conjunctivae clear, lids normal.  ENMT: Lips without lesions, MMM. Oropharynx is clear. Bilateral TMs are within normal limits.  Pulmonary: Unlabored respiratory effort. Lungs clear to auscultation, no wheezes, no rhonchi.    Cardiovascular: Regular rate and rhythm without murmur.   Neurologic: Grossly nonfocal. No facial asymmetry noted.  Lymph: No cervical lymphadenopathy noted.  Skin: " Warm, dry, good turgor. No rashes in visible areas.   Psych: Normal mood. Alert and oriented to person, place and time.    Chest x-ray, radiology:  Negative two views of the chest.    Assessment/Plan:  Discussed likely viral etiology.  X-rays negative for pneumonia.  Having patient start steroids. Contingent antibiotic prescription given to patient to fill upon meeting criteria of guidelines discussed. Discussed appropriate over-the-counter symptomatic medication, and when to return to clinic. Follow up for worsening or persistent symptoms.  1. Lower resp. tract infection  Hydrocod Polst-CPM Polst ER (TUSSIONEX PENNKINETIC ER) 10-8 MG/5ML Suspension Extended Release    doxycycline (VIBRAMYCIN) 100 MG Tab    benzonatate (TESSALON) 200 MG capsule   2. Fever, unspecified fever cause  DX-CHEST-2 VIEWS   3. SOB (shortness of breath)  DX-CHEST-2 VIEWS    predniSONE (DELTASONE) 10 MG Tab

## 2020-03-19 DIAGNOSIS — I10 ESSENTIAL HYPERTENSION: ICD-10-CM

## 2020-03-23 LAB
ALBUMIN SERPL-MCNC: 4.6 G/DL (ref 3.8–4.9)
ALBUMIN/GLOB SERPL: 2.3 {RATIO} (ref 1.2–2.2)
ALP SERPL-CCNC: 97 IU/L (ref 39–117)
ALT SERPL-CCNC: 52 IU/L (ref 0–32)
AST SERPL-CCNC: 46 IU/L (ref 0–40)
BILIRUB SERPL-MCNC: 0.4 MG/DL (ref 0–1.2)
BUN SERPL-MCNC: 15 MG/DL (ref 6–24)
BUN/CREAT SERPL: 16 (ref 9–23)
CALCIUM SERPL-MCNC: 9.3 MG/DL (ref 8.7–10.2)
CHLORIDE SERPL-SCNC: 103 MMOL/L (ref 96–106)
CHOLEST SERPL-MCNC: 136 MG/DL (ref 100–199)
CO2 SERPL-SCNC: 22 MMOL/L (ref 20–29)
CREAT SERPL-MCNC: 0.95 MG/DL (ref 0.57–1)
GLOBULIN SER CALC-MCNC: 2 G/DL (ref 1.5–4.5)
GLUCOSE SERPL-MCNC: 128 MG/DL (ref 65–99)
HBA1C MFR BLD: 5.9 % (ref 4.8–5.6)
HDLC SERPL-MCNC: 53 MG/DL
LABORATORY COMMENT REPORT: NORMAL
LDLC SERPL CALC-MCNC: 61 MG/DL (ref 0–99)
POTASSIUM SERPL-SCNC: 4.2 MMOL/L (ref 3.5–5.2)
PROT SERPL-MCNC: 6.6 G/DL (ref 6–8.5)
SODIUM SERPL-SCNC: 141 MMOL/L (ref 134–144)
TRIGL SERPL-MCNC: 112 MG/DL (ref 0–149)
VLDLC SERPL CALC-MCNC: 22 MG/DL (ref 5–40)

## 2020-03-23 RX ORDER — PROPRANOLOL HYDROCHLORIDE 80 MG/1
80 CAPSULE, EXTENDED RELEASE ORAL
Qty: 90 CAP | Refills: 3 | Status: SHIPPED | OUTPATIENT
Start: 2020-03-23 | End: 2021-01-26

## 2020-03-23 RX ORDER — PROPRANOLOL HYDROCHLORIDE 80 MG/1
80 CAPSULE, EXTENDED RELEASE ORAL
Qty: 90 CAP | Refills: 3 | OUTPATIENT
Start: 2020-03-23

## 2020-03-30 RX ORDER — FLUTICASONE PROPIONATE 50 MCG
1 SPRAY, SUSPENSION (ML) NASAL DAILY
Qty: 16 G | Refills: 11 | Status: SHIPPED | OUTPATIENT
Start: 2020-03-30 | End: 2021-01-26

## 2020-03-30 RX ORDER — FLUTICASONE PROPIONATE 50 MCG
1 SPRAY, SUSPENSION (ML) NASAL DAILY
Qty: 16 G | Refills: 5 | Status: SHIPPED | OUTPATIENT
Start: 2020-03-30 | End: 2021-01-26

## 2020-03-30 NOTE — TELEPHONE ENCOUNTER
Was the patient seen in the last year in this department? Yes    Does patient have an active prescription for medications requested? No     Received Request Via: Pharmacy      Pt met protocol?: Yes   Pt last ov 1/2020

## 2020-04-01 DIAGNOSIS — R79.89 ELEVATED LFTS: ICD-10-CM

## 2020-04-01 NOTE — RESULT ENCOUNTER NOTE
Silver Rankin,   Your labs show your cholesterol levels are beautiful and in the normal range. But your liver enzymes got bumped up a bit. I'd like to recheck the liver tests in a few months and make sure they are not trending up too high. Please get this lab done in 1-2 months. A1c is stable at 5.9!  Christian Milner M.D.

## 2020-04-14 DIAGNOSIS — K21.9 GASTROESOPHAGEAL REFLUX DISEASE WITHOUT ESOPHAGITIS: Chronic | ICD-10-CM

## 2020-04-15 RX ORDER — OMEPRAZOLE 40 MG/1
CAPSULE, DELAYED RELEASE ORAL
Qty: 90 CAP | Refills: 1 | Status: SHIPPED | OUTPATIENT
Start: 2020-04-15 | End: 2021-01-26

## 2020-04-16 RX ORDER — OMEPRAZOLE 40 MG/1
CAPSULE, DELAYED RELEASE ORAL
Qty: 90 CAP | Refills: 3 | OUTPATIENT
Start: 2020-04-16

## 2020-08-18 RX ORDER — ROSUVASTATIN CALCIUM 10 MG/1
10 TABLET, COATED ORAL EVERY EVENING
Qty: 90 TAB | Refills: 3 | Status: SHIPPED | OUTPATIENT
Start: 2020-08-18 | End: 2021-01-26

## 2020-09-02 ENCOUNTER — HOSPITAL ENCOUNTER (OUTPATIENT)
Dept: HOSPITAL 8 - STAR | Age: 55
Discharge: HOME | End: 2020-09-02
Attending: SURGERY
Payer: COMMERCIAL

## 2020-09-02 DIAGNOSIS — Z01.818: Primary | ICD-10-CM

## 2020-09-02 LAB
% IRON SATURATION: 19 % (ref 20–55)
ALBUMIN SERPL-MCNC: 4.2 G/DL (ref 3.4–5)
ALP SERPL-CCNC: 88 U/L (ref 45–117)
ALT SERPL-CCNC: 58 U/L (ref 12–78)
ANION GAP SERPL CALC-SCNC: 6 MMOL/L (ref 5–15)
BASOPHILS # BLD AUTO: 0.03 X10^3/UL (ref 0–0.1)
BASOPHILS NFR BLD AUTO: 0 % (ref 0–1)
BILIRUB SERPL-MCNC: 0.5 MG/DL (ref 0.2–1)
CALCIUM SERPL-MCNC: 9.8 MG/DL (ref 8.5–10.1)
CHLORIDE SERPL-SCNC: 107 MMOL/L (ref 98–107)
CREAT SERPL-MCNC: 0.94 MG/DL (ref 0.55–1.02)
EOSINOPHIL # BLD AUTO: 0.1 X10^3/UL (ref 0–0.4)
EOSINOPHIL NFR BLD AUTO: 2 % (ref 1–7)
ERYTHROCYTE [DISTWIDTH] IN BLOOD BY AUTOMATED COUNT: 12.3 % (ref 9.6–15.2)
IRON LEVEL: 60 MCG/DL (ref 50–170)
LYMPHOCYTES # BLD AUTO: 1.86 X10^3/UL (ref 1–3.4)
LYMPHOCYTES NFR BLD AUTO: 29 % (ref 22–44)
MCH RBC QN AUTO: 32.6 PG (ref 27–34.8)
MCHC RBC AUTO-ENTMCNC: 33.5 G/DL (ref 32.4–35.8)
MCV RBC AUTO: 97.3 FL (ref 80–100)
MD: NO
MONOCYTES # BLD AUTO: 0.53 X10^3/UL (ref 0.2–0.8)
MONOCYTES NFR BLD AUTO: 8 % (ref 2–9)
NEUTROPHILS # BLD AUTO: 4.02 X10^3/UL (ref 1.8–6.8)
NEUTROPHILS NFR BLD AUTO: 62 % (ref 42–75)
PLATELET # BLD AUTO: 225 X10^3/UL (ref 130–400)
PMV BLD AUTO: 8.2 FL (ref 7.4–10.4)
PREALB SERPL-MCNC: 26.8 MG/DL (ref 20–40)
PROT SERPL-MCNC: 7.5 G/DL (ref 6.4–8.2)
RBC # BLD AUTO: 4.38 X10^6/UL (ref 3.82–5.3)
TIBC SERPL-MCNC: 317 MCG/DL (ref 250–450)
TRANSFERRIN SERPL-MCNC: 241 MG/DL (ref 200–360)

## 2020-09-02 PROCEDURE — 82306 VITAMIN D 25 HYDROXY: CPT

## 2020-09-02 PROCEDURE — 93005 ELECTROCARDIOGRAM TRACING: CPT

## 2020-09-02 PROCEDURE — 85025 COMPLETE CBC W/AUTO DIFF WBC: CPT

## 2020-09-02 PROCEDURE — 80053 COMPREHEN METABOLIC PANEL: CPT

## 2020-09-02 PROCEDURE — 84466 ASSAY OF TRANSFERRIN: CPT

## 2020-09-02 PROCEDURE — 83550 IRON BINDING TEST: CPT

## 2020-09-02 PROCEDURE — 83970 ASSAY OF PARATHORMONE: CPT

## 2020-09-02 PROCEDURE — 82728 ASSAY OF FERRITIN: CPT

## 2020-09-02 PROCEDURE — 83540 ASSAY OF IRON: CPT

## 2020-09-02 PROCEDURE — 82746 ASSAY OF FOLIC ACID SERUM: CPT

## 2020-09-02 PROCEDURE — 84425 ASSAY OF VITAMIN B-1: CPT

## 2020-09-02 PROCEDURE — 84134 ASSAY OF PREALBUMIN: CPT

## 2020-09-02 PROCEDURE — 82607 VITAMIN B-12: CPT

## 2020-09-02 PROCEDURE — 71046 X-RAY EXAM CHEST 2 VIEWS: CPT

## 2020-09-02 PROCEDURE — 36415 COLL VENOUS BLD VENIPUNCTURE: CPT

## 2020-09-11 ENCOUNTER — HOSPITAL ENCOUNTER (OUTPATIENT)
Dept: HOSPITAL 8 - STAR | Age: 55
Discharge: HOME | End: 2020-09-11
Payer: COMMERCIAL

## 2020-09-11 DIAGNOSIS — Z01.812: Primary | ICD-10-CM

## 2020-09-11 DIAGNOSIS — Z20.828: ICD-10-CM

## 2020-09-11 PROCEDURE — 87635 SARS-COV-2 COVID-19 AMP PRB: CPT

## 2020-09-11 PROCEDURE — 36415 COLL VENOUS BLD VENIPUNCTURE: CPT

## 2021-01-26 ENCOUNTER — OFFICE VISIT (OUTPATIENT)
Dept: URGENT CARE | Facility: PHYSICIAN GROUP | Age: 56
End: 2021-01-26
Payer: COMMERCIAL

## 2021-01-26 VITALS
HEIGHT: 62 IN | TEMPERATURE: 97.7 F | RESPIRATION RATE: 16 BRPM | HEART RATE: 91 BPM | WEIGHT: 178.8 LBS | BODY MASS INDEX: 32.9 KG/M2 | SYSTOLIC BLOOD PRESSURE: 126 MMHG | OXYGEN SATURATION: 95 % | DIASTOLIC BLOOD PRESSURE: 82 MMHG

## 2021-01-26 DIAGNOSIS — K21.9 GASTROESOPHAGEAL REFLUX DISEASE, UNSPECIFIED WHETHER ESOPHAGITIS PRESENT: Chronic | ICD-10-CM

## 2021-01-26 DIAGNOSIS — M54.9 DORSALGIA: ICD-10-CM

## 2021-01-26 DIAGNOSIS — R51.9 NONINTRACTABLE HEADACHE, UNSPECIFIED CHRONICITY PATTERN, UNSPECIFIED HEADACHE TYPE: ICD-10-CM

## 2021-01-26 PROCEDURE — 99214 OFFICE O/P EST MOD 30 MIN: CPT | Performed by: FAMILY MEDICINE

## 2021-01-26 RX ORDER — OMEPRAZOLE 40 MG/1
40 CAPSULE, DELAYED RELEASE ORAL DAILY
Qty: 30 CAP | Refills: 1 | Status: SHIPPED | OUTPATIENT
Start: 2021-01-26 | End: 2021-05-18 | Stop reason: SDUPTHER

## 2021-01-26 RX ORDER — CARISOPRODOL 350 MG/1
350 TABLET ORAL EVERY 8 HOURS PRN
Qty: 21 TAB | Refills: 1 | Status: SHIPPED | OUTPATIENT
Start: 2021-01-26 | End: 2021-02-02

## 2021-01-26 ASSESSMENT — ENCOUNTER SYMPTOMS
MYALGIAS: 1
HEADACHES: 1
HEARTBURN: 1
BACK PAIN: 1

## 2021-01-26 NOTE — PROGRESS NOTES
Subjective:      Massiel Davis is a 56 y.o. female who presents with Shoulder Pain (x2 weeks with pain traveling into neck, migrane. pain startes (L) shoulder blade into neck then moves into (R) side arm. ) and Medication Refill (refill on prilosec 40 mg. )      - This is a pleasant and nontoxic appearing 56 y.o. female with c/o ~ 2 wks w/ upper back/neck pain. Woke up w/ it one morning. No trauma, non radiating. Worse w/ certain movements or positions. Better w/ using hot tub and flexeril but is running out. Past past few days pain radiates to back of head.     - Hx GERD, needs refill of her meds. Unable to see PCP at moment for this             ALLERGIES:  Pcn [penicillins]     PMH:  Past Medical History:   Diagnosis Date   • Fatigue 12/28/2010   • GERD (gastroesophageal reflux disease) 12/28/2010   • Hypertension    • Hypoglycemia 12/28/2010   • Irregular menses 12/28/2010   • Lactose intolerance 12/28/2010   • Menopause, premature 7/24/2012   • Menorrhagia 1/18/2011   • Nocturnal muscle spasm 12/28/2010   • Primary snoring 12/28/2010   • Unspecified vitamin D deficiency 12/28/2010        PSH:  History reviewed. No pertinent surgical history.    MEDS:    Current Outpatient Medications:   •  omeprazole (PRILOSEC) 40 MG delayed-release capsule, Take 1 Cap by mouth every day., Disp: 30 Cap, Rfl: 1  •  carisoprodol (SOMA) 350 MG Tab, Take 1 Tab by mouth every 8 hours as needed for Muscle Spasms for up to 7 days., Disp: 21 Tab, Rfl: 1  •  albuterol 108 (90 Base) MCG/ACT Aero Soln inhalation aerosol, Inhale 2 Puffs by mouth every four hours as needed for Shortness of Breath., Disp: 1 Inhaler, Rfl: 5  •  ondansetron (ZOFRAN) 4 MG Tab tablet, Take 1 tablt by mouth every 4 hours as needed for nausea or vomiting, Disp: 20 Tab, Rfl: 0  •  albuterol 108 (90 Base) MCG/ACT Aero Soln inhalation aerosol, Inhale 2 Puffs by mouth every 6 hours as needed for Shortness of Breath., Disp: 8.5 g, Rfl: 1  •  Multiple Vitamin  "(MULTI VITAMIN DAILY PO), Take  by mouth., Disp: , Rfl:   •  Cholecalciferol (VITAMIN D PO), Take  by mouth., Disp: , Rfl:   •  Calcium Carbonate-Vitamin D (CALCIUM + D PO), Take  by mouth., Disp: , Rfl:   •  Misc. Devices MISC, New CPAP mask and supplies; dog chewed up mask; fax to [employer], Disp: 1 Device, Rfl: 3  •  diclofenac EC (VOLTAREN) 75 MG Tablet Delayed Response, Take 1 Tab by mouth 2 times a day as needed. PAIN, Disp: 60 Tab, Rfl: 3  •  Omega-3 Fatty Acids (FISH OIL PO), Take  by mouth., Disp: , Rfl:   •  Magnesium 100 MG Cap, Take  by mouth., Disp: , Rfl:     ** I have documented what I find to be significant in regards to past medical, social, family and surgical history  in my HPI or under PMH/PSH/FH review section, otherwise it is noncontributory **           HPI    Review of Systems   Gastrointestinal: Positive for heartburn.   Musculoskeletal: Positive for back pain, joint pain and myalgias.   Neurological: Positive for headaches.          Objective:     /82   Pulse 91   Temp 36.5 °C (97.7 °F) (Temporal)   Resp 16   Ht 1.575 m (5' 2\")   Wt 81.1 kg (178 lb 12.8 oz)   LMP 03/31/2011   SpO2 95%   BMI 32.70 kg/m²      Physical Exam  Vitals signs and nursing note reviewed.   Constitutional:       General: She is not in acute distress.     Appearance: She is well-developed. She is not diaphoretic.   HENT:      Head: Normocephalic and atraumatic.      Mouth/Throat:      Mouth: Mucous membranes are moist.      Pharynx: Oropharynx is clear.   Eyes:      General: No scleral icterus.     Conjunctiva/sclera: Conjunctivae normal.   Neck:      Musculoskeletal: Normal range of motion and neck supple. Muscular tenderness present. No neck rigidity.        Comments: + PS muscle TTP, no mid line TTP. Good ROM  Cardiovascular:      Heart sounds: Normal heart sounds. No murmur.   Pulmonary:      Effort: Pulmonary effort is normal. No respiratory distress.      Breath sounds: Normal breath sounds.   Skin:   "   Coloration: Skin is not pale.      Findings: No rash.   Neurological:      Mental Status: She is alert.      Motor: No abnormal muscle tone.   Psychiatric:         Mood and Affect: Mood normal.         Behavior: Behavior normal.         Judgment: Judgment normal.                 Assessment/Plan:            1. Gastroesophageal reflux disease, unspecified whether esophagitis present  omeprazole (PRILOSEC) 40 MG delayed-release capsule   2. Nonintractable headache, unspecified chronicity pattern, unspecified headache type     3. Dorsalgia  carisoprodol (SOMA) 350 MG Tab         - Dx, plan & d/c instructions discussed w/ patient and/or family members  - Rest, stay hydrated OTC Motrin and/or Tylenol as needed  - E.R. precautions discussed       Follow up with their PCP in 2-3 days strongly advised, ER if not improving or feeling/getting worse.    Any realistic side effects of medications that may have been given today (i.e. Rash, GI upset/constipation, sedation, elevation of BP or blood sugars) discussed.     Patient left in stable condition      reviewed if narcotics given

## 2021-04-27 DIAGNOSIS — Z91.09 ENVIRONMENTAL ALLERGIES: ICD-10-CM

## 2021-04-27 DIAGNOSIS — E78.2 MIXED HYPERLIPIDEMIA: ICD-10-CM

## 2021-04-27 DIAGNOSIS — R05.9 COUGH: ICD-10-CM

## 2021-04-27 DIAGNOSIS — E55.9 VITAMIN D DEFICIENCY: ICD-10-CM

## 2021-04-27 DIAGNOSIS — R73.03 PREDIABETES: ICD-10-CM

## 2021-04-27 DIAGNOSIS — R79.89 ELEVATED LFTS: ICD-10-CM

## 2021-04-27 RX ORDER — FLUTICASONE PROPIONATE 50 MCG
1 SPRAY, SUSPENSION (ML) NASAL DAILY
Qty: 16 G | Refills: 3 | Status: SHIPPED | OUTPATIENT
Start: 2021-04-27 | End: 2023-08-17

## 2021-04-27 RX ORDER — MONTELUKAST SODIUM 10 MG/1
10 TABLET ORAL DAILY
Qty: 90 TABLET | Refills: 3 | Status: SHIPPED | OUTPATIENT
Start: 2021-04-27 | End: 2022-05-23 | Stop reason: SDUPTHER

## 2021-05-13 ENCOUNTER — HOSPITAL ENCOUNTER (OUTPATIENT)
Dept: RADIOLOGY | Facility: MEDICAL CENTER | Age: 56
End: 2021-05-13
Attending: FAMILY MEDICINE
Payer: COMMERCIAL

## 2021-05-13 DIAGNOSIS — Z12.31 VISIT FOR SCREENING MAMMOGRAM: ICD-10-CM

## 2021-05-13 PROCEDURE — 77063 BREAST TOMOSYNTHESIS BI: CPT

## 2021-05-14 NOTE — RESULT ENCOUNTER NOTE
Released to [facility]  Your mammogram was normal! Next is due in one year.   Please let me know immediately if you notice any new lumps/rashes/pain/nipple discharge.  Christian Milner M.D.

## 2021-05-18 ENCOUNTER — OFFICE VISIT (OUTPATIENT)
Dept: MEDICAL GROUP | Facility: PHYSICIAN GROUP | Age: 56
End: 2021-05-18
Payer: COMMERCIAL

## 2021-05-18 ENCOUNTER — APPOINTMENT (OUTPATIENT)
Dept: RADIOLOGY | Facility: IMAGING CENTER | Age: 56
End: 2021-05-18
Attending: FAMILY MEDICINE
Payer: COMMERCIAL

## 2021-05-18 ENCOUNTER — HOSPITAL ENCOUNTER (OUTPATIENT)
Facility: MEDICAL CENTER | Age: 56
End: 2021-05-18
Attending: FAMILY MEDICINE
Payer: COMMERCIAL

## 2021-05-18 VITALS
SYSTOLIC BLOOD PRESSURE: 110 MMHG | HEART RATE: 91 BPM | RESPIRATION RATE: 16 BRPM | TEMPERATURE: 97.8 F | BODY MASS INDEX: 30.36 KG/M2 | HEIGHT: 62 IN | WEIGHT: 165 LBS | DIASTOLIC BLOOD PRESSURE: 68 MMHG | OXYGEN SATURATION: 98 %

## 2021-05-18 DIAGNOSIS — K21.9 GASTROESOPHAGEAL REFLUX DISEASE, UNSPECIFIED WHETHER ESOPHAGITIS PRESENT: Chronic | ICD-10-CM

## 2021-05-18 DIAGNOSIS — M54.2 CERVICALGIA: ICD-10-CM

## 2021-05-18 DIAGNOSIS — Z12.4 CERVICAL CANCER SCREENING: ICD-10-CM

## 2021-05-18 PROCEDURE — 99396 PREV VISIT EST AGE 40-64: CPT | Performed by: FAMILY MEDICINE

## 2021-05-18 PROCEDURE — 87624 HPV HI-RISK TYP POOLED RSLT: CPT

## 2021-05-18 PROCEDURE — 88175 CYTOPATH C/V AUTO FLUID REDO: CPT

## 2021-05-18 PROCEDURE — 99214 OFFICE O/P EST MOD 30 MIN: CPT | Mod: 25 | Performed by: FAMILY MEDICINE

## 2021-05-18 PROCEDURE — 72040 X-RAY EXAM NECK SPINE 2-3 VW: CPT | Mod: TC,FY | Performed by: FAMILY MEDICINE

## 2021-05-18 PROCEDURE — 99000 SPECIMEN HANDLING OFFICE-LAB: CPT | Performed by: FAMILY MEDICINE

## 2021-05-18 RX ORDER — ONDANSETRON 8 MG/1
TABLET, ORALLY DISINTEGRATING ORAL
COMMUNITY
Start: 2021-03-31 | End: 2021-05-18

## 2021-05-18 RX ORDER — CYCLOBENZAPRINE HCL 5 MG
5 TABLET ORAL 3 TIMES DAILY PRN
Qty: 90 TABLET | Refills: 1 | Status: SHIPPED | OUTPATIENT
Start: 2021-05-18 | End: 2023-06-20 | Stop reason: SDUPTHER

## 2021-05-18 RX ORDER — OMEPRAZOLE 40 MG/1
40 CAPSULE, DELAYED RELEASE ORAL DAILY
Qty: 90 CAPSULE | Refills: 3 | Status: SHIPPED | OUTPATIENT
Start: 2021-05-18 | End: 2023-07-21

## 2021-05-18 ASSESSMENT — PATIENT HEALTH QUESTIONNAIRE - PHQ9: CLINICAL INTERPRETATION OF PHQ2 SCORE: 0

## 2021-05-18 NOTE — ASSESSMENT & PLAN NOTE
Patient is due for her Pap smear.  Pap and HPV ordered today.  Pelvic exam with no concerns.  No history of abnormal Pap.

## 2021-05-18 NOTE — ASSESSMENT & PLAN NOTE
Since January, she has neck pain radiating to her right shoulder, right upper back and shoulder blade.   She had no trauma that caused the pain, was evaluated in UC but the muscle relaxant has not helped completely.   When she picks up something, at times she has a shock of pain running from her shoulder up to her neck.   Will refer to PT and cervical spine xray.

## 2021-05-18 NOTE — PROGRESS NOTES
Subjective:   Larisa Davis is a 56 y.o. female here today for evaluation and management of:     Cervicalgia  Since January, she has neck pain radiating to her right shoulder, right upper back and shoulder blade.   She had no trauma that caused the pain, was evaluated in UC but the muscle relaxant has not helped completely.   When she picks up something, at times she has a shock of pain running from her shoulder up to her neck.   Will refer to PT and cervical spine xray.         Cervical cancer screening  Patient is due for her Pap smear.  Pap and HPV ordered today.  Pelvic exam with no concerns.  No history of abnormal Pap.         Current medicines (including changes today)  Current Outpatient Medications   Medication Sig Dispense Refill   • omeprazole (PRILOSEC) 40 MG delayed-release capsule Take 1 capsule by mouth every day. 90 capsule 3   • cyclobenzaprine (FLEXERIL) 5 mg tablet Take 1 tablet by mouth 3 times a day as needed. 90 tablet 1   • montelukast (SINGULAIR) 10 MG Tab Take 1 tablet by mouth every day. For seasonal allergies 90 tablet 3   • fluticasone (FLONASE) 50 MCG/ACT nasal spray Administer 1 Spray into affected nostril(S) every day. For seasonal allergies 16 g 3   • albuterol 108 (90 Base) MCG/ACT Aero Soln inhalation aerosol Inhale 2 Puffs by mouth every four hours as needed for Shortness of Breath. 1 Inhaler 5   • diclofenac EC (VOLTAREN) 75 MG Tablet Delayed Response Take 1 Tab by mouth 2 times a day as needed. PAIN 60 Tab 3   • ondansetron (ZOFRAN) 4 MG Tab tablet Take 1 tablt by mouth every 4 hours as needed for nausea or vomiting 20 Tab 0   • Omega-3 Fatty Acids (FISH OIL PO) Take  by mouth.     • Multiple Vitamin (MULTI VITAMIN DAILY PO) Take  by mouth.     • Cholecalciferol (VITAMIN D PO) Take  by mouth.     • Magnesium 100 MG Cap Take  by mouth.     • Calcium Carbonate-Vitamin D (CALCIUM + D PO) Take  by mouth.     • Misc. Devices MISC New CPAP mask and supplies; dog chewed up  "mask; fax to [facility] 1 Device 3     No current facility-administered medications for this visit.     She  has a past medical history of Fatigue (12/28/2010), GERD (gastroesophageal reflux disease) (12/28/2010), Hypertension, Hypoglycemia (12/28/2010), Irregular menses (12/28/2010), Lactose intolerance (12/28/2010), Menopause, premature (7/24/2012), Menorrhagia (1/18/2011), Nocturnal muscle spasm (12/28/2010), Primary snoring (12/28/2010), and Unspecified vitamin D deficiency (12/28/2010).    ROS  No chest pain, no shortness of breath, no abdominal pain       Objective:     /68   Pulse 91   Temp 36.6 °C (97.8 °F) (Temporal)   Resp 16   Ht 1.575 m (5' 2\")   Wt 74.8 kg (165 lb)   SpO2 98%  Body mass index is 30.18 kg/m².   Physical Exam:  Constitutional: Alert, no distress.  Skin: Warm, dry, good turgor, no rashes in visible areas.  Eye: Equal, round and reactive, conjunctiva clear, lids normal.  ENMT: Lips without lesions, good dentition, oropharynx clear.  Neck: Trachea midline, no masses, no thyromegaly. No cervical or supraclavicular lymphadenopathy  Respiratory: Unlabored respiratory effort, lungs clear to auscultation, no wheezes, no ronchi.  Cardiovascular: Normal S1, S2, no murmur, no edema.  Abdomen: Soft, non-tender, no masses, no hepatosplenomegaly.  Psych: Alert and oriented x3, normal affect and mood.  Pelvic: Normal external no lesions normal vaginal no lesions, normal cervix no lesions, uterus normal size, no adnexal masses.      Assessment and Plan:   The following treatment plan was discussed    1. Cervical cancer screening    - THINPREP PAP WITH HPV; Future    2. Gastroesophageal reflux disease, unspecified whether esophagitis present    - omeprazole (PRILOSEC) 40 MG delayed-release capsule; Take 1 capsule by mouth every day.  Dispense: 90 capsule; Refill: 3    3. Cervicalgia  - cyclobenzaprine (FLEXERIL) 5 mg tablet; Take 1 tablet by mouth 3 times a day as needed.  Dispense: 90 tablet; " Refill: 1  - REFERRAL TO PHYSICAL THERAPY  - DX-CERVICAL SPINE-2 OR 3 VIEWS; Future      Followup: No follow-ups on file.

## 2021-05-20 DIAGNOSIS — Z12.4 CERVICAL CANCER SCREENING: ICD-10-CM

## 2021-05-26 NOTE — RESULT ENCOUNTER NOTE
[facility]  Your pap and hpv are both normal, next is due in 3-5 years!  Christian Milner M.D.

## 2021-05-31 ENCOUNTER — PATIENT MESSAGE (OUTPATIENT)
Dept: MEDICAL GROUP | Facility: PHYSICIAN GROUP | Age: 56
End: 2021-05-31

## 2021-06-01 ENCOUNTER — PATIENT MESSAGE (OUTPATIENT)
Dept: MEDICAL GROUP | Facility: PHYSICIAN GROUP | Age: 56
End: 2021-06-01

## 2021-06-01 NOTE — TELEPHONE ENCOUNTER
From: Larisa Davis  To: Medical Assistant Sonia CORBIN  Sent: 6/1/2021 3:19 PM PDT  Subject: Test Result Question    Hi. Is there [facility]      ----- Message -----   From:Medical Assistant Sonia CORBIN   Sent:6/1/2021 8:54 AM PDT   To:Larisa Davis   Subject:RE: Test Result Question    Morning Massiel,     Your labs have neem received and scanned into your chart       ----- Message -----   From:Larisa Davis   Sent:5/31/2021 6:06 PM PDT   To:Physician Christian Milner   Subject:Test Result Question    Hi. I'm just wondering if you have the blood labs yet. I did them at [place] in May 21. Usually it's quicker. Thanks have a great day.

## 2021-06-01 NOTE — TELEPHONE ENCOUNTER
From: Larisa Davis  To: Physician Christian Milner  Sent: 5/31/2021 6:06 PM PDT  Subject: Test Result Question    Hi. I'm just wondering if you have the blood labs yet. I did them at [place] in May 21. Usually it's quicker. Thanks have a great day.

## 2021-08-05 ENCOUNTER — OFFICE VISIT (OUTPATIENT)
Dept: URGENT CARE | Facility: PHYSICIAN GROUP | Age: 56
End: 2021-08-05
Payer: COMMERCIAL

## 2021-08-05 VITALS
BODY MASS INDEX: 30.33 KG/M2 | HEART RATE: 97 BPM | OXYGEN SATURATION: 98 % | DIASTOLIC BLOOD PRESSURE: 78 MMHG | TEMPERATURE: 98.4 F | WEIGHT: 164.8 LBS | HEIGHT: 62 IN | SYSTOLIC BLOOD PRESSURE: 120 MMHG | RESPIRATION RATE: 16 BRPM

## 2021-08-05 DIAGNOSIS — K21.9 GERD WITHOUT ESOPHAGITIS: ICD-10-CM

## 2021-08-05 DIAGNOSIS — H65.199 ACUTE OTITIS MEDIA WITH EFFUSION: ICD-10-CM

## 2021-08-05 PROCEDURE — 99214 OFFICE O/P EST MOD 30 MIN: CPT | Performed by: NURSE PRACTITIONER

## 2021-08-05 RX ORDER — CEFDINIR 300 MG/1
300 CAPSULE ORAL 2 TIMES DAILY
Qty: 10 CAPSULE | Refills: 0 | Status: SHIPPED | OUTPATIENT
Start: 2021-08-05 | End: 2021-08-10

## 2021-08-05 RX ORDER — EPINEPHRINE 0.3 MG/.3ML
INJECTION SUBCUTANEOUS
COMMUNITY
Start: 2021-06-25

## 2021-08-05 RX ORDER — OMEPRAZOLE 10 MG/1
20 CAPSULE, DELAYED RELEASE ORAL DAILY
Qty: 30 CAPSULE | Refills: 2 | Status: SHIPPED | OUTPATIENT
Start: 2021-08-05 | End: 2023-07-21

## 2021-08-05 ASSESSMENT — ENCOUNTER SYMPTOMS
COUGH: 0
HEADACHES: 0
FEVER: 0
MYALGIAS: 0
DIZZINESS: 0
SORE THROAT: 0
CHILLS: 0
NAUSEA: 0

## 2021-08-06 NOTE — PROGRESS NOTES
Subjective:      Massiel Davis is a 56 y.o. female who presents with Otalgia            HPI   New problem.  56-year-old female who presents with left-sided ear pain since earlier today.  She reports that she had some pain and then came over the [place] on her way back home from [place] and developed more persistent sharp stabbing pain to the left ear.  She denies fever, chills, congestion, cough, dizziness, headache, or nausea.  She has been taking Tylenol for her symptoms with mild relief.  She does report a history of seasonal allergies and does take Flonase for this.  Pcn [penicillins]  Current Outpatient Medications on File Prior to Visit   Medication Sig Dispense Refill   • EPINEPHrine (EPIPEN) 0.3 MG/0.3ML Solution Auto-injector solution for injection FOR SEVERE ALLERGIC REACTION     • omeprazole (PRILOSEC) 40 MG delayed-release capsule Take 1 capsule by mouth every day. 90 capsule 3   • cyclobenzaprine (FLEXERIL) 5 mg tablet Take 1 tablet by mouth 3 times a day as needed. 90 tablet 1   • montelukast (SINGULAIR) 10 MG Tab Take 1 tablet by mouth every day. For seasonal allergies 90 tablet 3   • fluticasone (FLONASE) 50 MCG/ACT nasal spray Administer 1 Spray into affected nostril(S) every day. For seasonal allergies 16 g 3   • albuterol 108 (90 Base) MCG/ACT Aero Soln inhalation aerosol Inhale 2 Puffs by mouth every four hours as needed for Shortness of Breath. 1 Inhaler 5   • diclofenac EC (VOLTAREN) 75 MG Tablet Delayed Response Take 1 Tab by mouth 2 times a day as needed. PAIN 60 Tab 3   • ondansetron (ZOFRAN) 4 MG Tab tablet Take 1 tablt by mouth every 4 hours as needed for nausea or vomiting 20 Tab 0   • Multiple Vitamin (MULTI VITAMIN DAILY PO) Take  by mouth.     • Cholecalciferol (VITAMIN D PO) Take  by mouth.     • Magnesium 100 MG Cap Take  by mouth.     • Calcium Carbonate-Vitamin D (CALCIUM + D PO) Take  by mouth.     • Misc. Devices MISC New CPAP mask and supplies; dog chewed up mask; fax to  [facility] 1 Device 3     No current facility-administered medications on file prior to visit.     Social History     Socioeconomic History   • Marital status:      Spouse name: Not on file   • Number of children: Not on file   • Years of education: Not on file   • Highest education level: Not on file   Occupational History   • Not on file   Tobacco Use   • Smoking status: Former Smoker     Packs/day: 1.00     Years: 10.00     Pack years: 10.00     Quit date: 1989     Years since quittin.6   • Smokeless tobacco: Never Used   Substance and Sexual Activity   • Alcohol use: Yes     Alcohol/week: 7.0 oz     Types: 14 Glasses of wine per week   • Drug use: No   • Sexual activity: Yes     Partners: Male     Birth control/protection: Other-See Comments     Comment: , 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   Other Topics Concern   •  Service Not Asked   • Blood Transfusions Not Asked   • Caffeine Concern Not Asked   • Occupational Exposure Not Asked   • Hobby Hazards Not Asked   • Sleep Concern Not Asked   • Stress Concern Not Asked   • Weight Concern Not Asked   • Special Diet Not Asked   • Back Care Not Asked   • Exercise No   • Bike Helmet No   • Seat Belt Yes   • Self-Exams Yes   Social History Narrative   • Not on file     Social Determinants of Health     Financial Resource Strain:    • Difficulty of Paying Living Expenses:    Food Insecurity:    • Worried About Running Out of Food in the Last Year:    • Ran Out of Food in the Last Year:    Transportation Needs:    • Lack of Transportation (Medical):    • Lack of Transportation (Non-Medical):    Physical Activity:    • Days of Exercise per Week:    • Minutes of Exercise per Session:    Stress:    • Feeling of Stress :    Social Connections:    • Frequency of Communication with Friends and Family:    • Frequency of Social Gatherings with Friends and Family:    • Attends Christian Services:    • Active Member of Clubs or Organizations:    •  "Attends Club or Organization Meetings:    • Marital Status:    Intimate Partner Violence:    • Fear of Current or Ex-Partner:    • Emotionally Abused:    • Physically Abused:    • Sexually Abused:      Breast Cancer-related family history is not on file.      Review of Systems   Constitutional: Negative for chills and fever.   HENT: Positive for ear pain. Negative for congestion and sore throat.    Respiratory: Negative for cough.    Gastrointestinal: Negative for nausea.   Musculoskeletal: Negative for myalgias.   Neurological: Negative for dizziness and headaches.          Objective:     /78   Pulse 97   Temp 36.9 °C (98.4 °F) (Temporal)   Resp 16   Ht 1.575 m (5' 2\")   Wt 74.8 kg (164 lb 12.8 oz)   LMP 03/31/2011   SpO2 98%   BMI 30.14 kg/m²      Physical Exam  Vitals and nursing note reviewed.   Constitutional:       General: She is not in acute distress.     Appearance: She is well-developed.   HENT:      Head: Normocephalic and atraumatic.      Right Ear: Tympanic membrane, ear canal and external ear normal. No middle ear effusion. Tympanic membrane is not injected or perforated.      Left Ear: Ear canal and external ear normal.  No middle ear effusion. Tympanic membrane is not injected or perforated.      Ears:      Comments: Patient presents with a mildly erythematous left tympanic membrane.  No perforation noted but does have a mild middle ear effusion as well.     Nose: Mucosal edema present.      Mouth/Throat:      Pharynx: Posterior oropharyngeal erythema present. No oropharyngeal exudate.   Eyes:      General:         Right eye: No discharge.         Left eye: No discharge.      Conjunctiva/sclera: Conjunctivae normal.   Cardiovascular:      Rate and Rhythm: Normal rate and regular rhythm.      Heart sounds: Normal heart sounds. No murmur heard.     Pulmonary:      Effort: Pulmonary effort is normal. No respiratory distress.      Breath sounds: Normal breath sounds.   Musculoskeletal:    "      General: Normal range of motion.      Cervical back: Normal range of motion and neck supple.      Comments: Normal movement of all 4 extremities.   Lymphadenopathy:      Cervical: No cervical adenopathy.      Upper Body:      Right upper body: No supraclavicular adenopathy.      Left upper body: No supraclavicular adenopathy.   Skin:     General: Skin is warm and dry.   Neurological:      Mental Status: She is alert and oriented to person, place, and time.      Gait: Gait normal.   Psychiatric:         Behavior: Behavior normal.         Thought Content: Thought content normal.                        Assessment/Plan:         1. Acute otitis media with effusion  cefdinir (OMNICEF) 300 MG Cap   2. GERD without esophagitis  omeprazole (PRILOSEC) 10 MG CAPSULE DELAYED RELEASE       Patient requesting refill on GERD medication. Insurance would not refill at 40 mg. Will trial her on 20 mg per day.  Differential diagnosis, natural history, supportive care, and indications for immediate follow-up discussed at length.

## 2022-05-23 NOTE — TELEPHONE ENCOUNTER
Received request via: Pharmacy    Was the patient seen in the last year in this department? Yes    Does the patient have an active prescription (recently filled or refills available) for medication(s) requested? No     Last ov 5/18/21

## 2022-05-24 RX ORDER — MONTELUKAST SODIUM 10 MG/1
10 TABLET ORAL DAILY
Qty: 90 TABLET | Refills: 3 | Status: SHIPPED | OUTPATIENT
Start: 2022-05-24 | End: 2023-07-04 | Stop reason: SDUPTHER

## 2022-08-04 ENCOUNTER — HOSPITAL ENCOUNTER (OUTPATIENT)
Dept: RADIOLOGY | Facility: MEDICAL CENTER | Age: 57
End: 2022-08-04
Attending: FAMILY MEDICINE
Payer: COMMERCIAL

## 2022-08-04 DIAGNOSIS — Z12.31 VISIT FOR SCREENING MAMMOGRAM: ICD-10-CM

## 2022-08-04 PROCEDURE — 77063 BREAST TOMOSYNTHESIS BI: CPT

## 2023-06-20 DIAGNOSIS — M54.2 CERVICALGIA: ICD-10-CM

## 2023-06-20 RX ORDER — CYCLOBENZAPRINE HCL 5 MG
5 TABLET ORAL 3 TIMES DAILY PRN
Qty: 90 TABLET | Refills: 1 | Status: SHIPPED | OUTPATIENT
Start: 2023-06-20

## 2023-06-20 NOTE — TELEPHONE ENCOUNTER
Received request via: Patient    Was the patient seen in the last year in this department? No    Does the patient have an active prescription (recently filled or refills available) for medication(s) requested? No    Does the patient have detention Plus and need 100 day supply (blood pressure, diabetes and cholesterol meds only)? Patient does not have SCP    Last Office Visit:05/18/2021  Last Labs:03/20/2020    Next appt:08/17/2023

## 2023-06-21 DIAGNOSIS — G89.29 CHRONIC MIDLINE LOW BACK PAIN WITHOUT SCIATICA: ICD-10-CM

## 2023-06-21 DIAGNOSIS — M54.50 CHRONIC MIDLINE LOW BACK PAIN WITHOUT SCIATICA: ICD-10-CM

## 2023-06-22 RX ORDER — DICLOFENAC SODIUM 75 MG/1
75 TABLET, DELAYED RELEASE ORAL 2 TIMES DAILY PRN
Qty: 60 TABLET | Refills: 3 | Status: SHIPPED | OUTPATIENT
Start: 2023-06-22

## 2023-06-22 NOTE — TELEPHONE ENCOUNTER
Last ov 11/2021  Next appt 8/17/23    Received request via: Patient    Was the patient seen in the last year in this department? No    Does the patient have an active prescription (recently filled or refills available) for medication(s) requested? No    Does the patient have assisted Plus and need 100 day supply (blood pressure, diabetes and cholesterol meds only)? Patient does not have SCP

## 2023-06-30 ENCOUNTER — TELEPHONE (OUTPATIENT)
Dept: URGENT CARE | Facility: PHYSICIAN GROUP | Age: 58
End: 2023-06-30

## 2023-07-05 RX ORDER — MONTELUKAST SODIUM 10 MG/1
10 TABLET ORAL DAILY
Qty: 90 TABLET | Refills: 3 | Status: SHIPPED | OUTPATIENT
Start: 2023-07-05 | End: 2023-08-17

## 2023-07-05 NOTE — TELEPHONE ENCOUNTER
Received request via: Patient    Was the patient seen in the last year in this department? No    Does the patient have an active prescription (recently filled or refills available) for medication(s) requested? No    Does the patient have nursing home Plus and need 100 day supply (blood pressure, diabetes and cholesterol meds only)? Patient does not have SCP    Last Office Visit:05/18/2021  Last Labs:09/02/2020

## 2023-07-19 NOTE — LETTER
February 25, 2020         Patient: Larisa Davis   YOB: 1965   Date of Visit: 2/25/2020           To Whom it May Concern:     Larisa Davis was seen in my clinic on 2/25/2020. She may return to work 2/26/2020.    If you have any questions or concerns, please don't hesitate to call.        Sincerely,           Araseli Latif P.A.-C.  Electronically Signed     
room air

## 2023-07-21 DIAGNOSIS — Z13.220 SCREENING, LIPID: ICD-10-CM

## 2023-07-21 DIAGNOSIS — R00.2 PALPITATIONS: ICD-10-CM

## 2023-07-21 DIAGNOSIS — E78.5 DYSLIPIDEMIA: ICD-10-CM

## 2023-07-21 DIAGNOSIS — E55.9 VITAMIN D DEFICIENCY: Chronic | ICD-10-CM

## 2023-07-21 DIAGNOSIS — R16.0 HEPATOMEGALY: ICD-10-CM

## 2023-08-16 SDOH — ECONOMIC STABILITY: FOOD INSECURITY: WITHIN THE PAST 12 MONTHS, YOU WORRIED THAT YOUR FOOD WOULD RUN OUT BEFORE YOU GOT MONEY TO BUY MORE.: PATIENT DECLINED

## 2023-08-16 SDOH — ECONOMIC STABILITY: HOUSING INSECURITY
IN THE LAST 12 MONTHS, WAS THERE A TIME WHEN YOU DID NOT HAVE A STEADY PLACE TO SLEEP OR SLEPT IN A SHELTER (INCLUDING NOW)?: PATIENT REFUSED

## 2023-08-16 SDOH — ECONOMIC STABILITY: INCOME INSECURITY: HOW HARD IS IT FOR YOU TO PAY FOR THE VERY BASICS LIKE FOOD, HOUSING, MEDICAL CARE, AND HEATING?: PATIENT DECLINED

## 2023-08-16 SDOH — ECONOMIC STABILITY: HOUSING INSECURITY

## 2023-08-16 SDOH — ECONOMIC STABILITY: FOOD INSECURITY: WITHIN THE PAST 12 MONTHS, THE FOOD YOU BOUGHT JUST DIDN'T LAST AND YOU DIDN'T HAVE MONEY TO GET MORE.: PATIENT DECLINED

## 2023-08-16 SDOH — HEALTH STABILITY: PHYSICAL HEALTH: ON AVERAGE, HOW MANY DAYS PER WEEK DO YOU ENGAGE IN MODERATE TO STRENUOUS EXERCISE (LIKE A BRISK WALK)?: 5 DAYS

## 2023-08-16 SDOH — HEALTH STABILITY: PHYSICAL HEALTH: ON AVERAGE, HOW MANY MINUTES DO YOU ENGAGE IN EXERCISE AT THIS LEVEL?: 30 MIN

## 2023-08-16 SDOH — ECONOMIC STABILITY: INCOME INSECURITY: IN THE LAST 12 MONTHS, WAS THERE A TIME WHEN YOU WERE NOT ABLE TO PAY THE MORTGAGE OR RENT ON TIME?: PATIENT REFUSED

## 2023-08-16 SDOH — ECONOMIC STABILITY: TRANSPORTATION INSECURITY
IN THE PAST 12 MONTHS, HAS LACK OF RELIABLE TRANSPORTATION KEPT YOU FROM MEDICAL APPOINTMENTS, MEETINGS, WORK OR FROM GETTING THINGS NEEDED FOR DAILY LIVING?: PATIENT DECLINED

## 2023-08-16 SDOH — ECONOMIC STABILITY: TRANSPORTATION INSECURITY
IN THE PAST 12 MONTHS, HAS LACK OF TRANSPORTATION KEPT YOU FROM MEETINGS, WORK, OR FROM GETTING THINGS NEEDED FOR DAILY LIVING?: PATIENT DECLINED

## 2023-08-16 SDOH — HEALTH STABILITY: MENTAL HEALTH
STRESS IS WHEN SOMEONE FEELS TENSE, NERVOUS, ANXIOUS, OR CAN'T SLEEP AT NIGHT BECAUSE THEIR MIND IS TROUBLED. HOW STRESSED ARE YOU?: TO SOME EXTENT

## 2023-08-16 SDOH — ECONOMIC STABILITY: TRANSPORTATION INSECURITY
IN THE PAST 12 MONTHS, HAS THE LACK OF TRANSPORTATION KEPT YOU FROM MEDICAL APPOINTMENTS OR FROM GETTING MEDICATIONS?: PATIENT DECLINED

## 2023-08-16 ASSESSMENT — LIFESTYLE VARIABLES
SKIP TO QUESTIONS 9-10: 0
HOW OFTEN DO YOU HAVE SIX OR MORE DRINKS ON ONE OCCASION: PATIENT DECLINED
AUDIT-C TOTAL SCORE: -1
HOW OFTEN DO YOU HAVE A DRINK CONTAINING ALCOHOL: PATIENT DECLINED
HOW MANY STANDARD DRINKS CONTAINING ALCOHOL DO YOU HAVE ON A TYPICAL DAY: PATIENT DECLINED

## 2023-08-16 ASSESSMENT — SOCIAL DETERMINANTS OF HEALTH (SDOH)
HOW OFTEN DO YOU HAVE SIX OR MORE DRINKS ON ONE OCCASION: PATIENT DECLINED
WITHIN THE PAST 12 MONTHS, YOU WORRIED THAT YOUR FOOD WOULD RUN OUT BEFORE YOU GOT THE MONEY TO BUY MORE: PATIENT DECLINED
IN A TYPICAL WEEK, HOW MANY TIMES DO YOU TALK ON THE PHONE WITH FAMILY, FRIENDS, OR NEIGHBORS?: PATIENT DECLINED
HOW OFTEN DO YOU HAVE A DRINK CONTAINING ALCOHOL: PATIENT DECLINED
HOW OFTEN DO YOU ATTEND CHURCH OR RELIGIOUS SERVICES?: PATIENT DECLINED
HOW OFTEN DO YOU GET TOGETHER WITH FRIENDS OR RELATIVES?: PATIENT DECLINED
HOW OFTEN DO YOU ATTENT MEETINGS OF THE CLUB OR ORGANIZATION YOU BELONG TO?: PATIENT DECLINED
HOW OFTEN DO YOU ATTEND CHURCH OR RELIGIOUS SERVICES?: PATIENT DECLINED
IN A TYPICAL WEEK, HOW MANY TIMES DO YOU TALK ON THE PHONE WITH FAMILY, FRIENDS, OR NEIGHBORS?: PATIENT DECLINED
HOW MANY DRINKS CONTAINING ALCOHOL DO YOU HAVE ON A TYPICAL DAY WHEN YOU ARE DRINKING: PATIENT DECLINED
DO YOU BELONG TO ANY CLUBS OR ORGANIZATIONS SUCH AS CHURCH GROUPS UNIONS, FRATERNAL OR ATHLETIC GROUPS, OR SCHOOL GROUPS?: PATIENT DECLINED
HOW OFTEN DO YOU ATTENT MEETINGS OF THE CLUB OR ORGANIZATION YOU BELONG TO?: PATIENT DECLINED
HOW OFTEN DO YOU GET TOGETHER WITH FRIENDS OR RELATIVES?: PATIENT DECLINED
DO YOU BELONG TO ANY CLUBS OR ORGANIZATIONS SUCH AS CHURCH GROUPS UNIONS, FRATERNAL OR ATHLETIC GROUPS, OR SCHOOL GROUPS?: PATIENT DECLINED
HOW HARD IS IT FOR YOU TO PAY FOR THE VERY BASICS LIKE FOOD, HOUSING, MEDICAL CARE, AND HEATING?: PATIENT DECLINED

## 2023-08-17 ENCOUNTER — OFFICE VISIT (OUTPATIENT)
Dept: MEDICAL GROUP | Facility: PHYSICIAN GROUP | Age: 58
End: 2023-08-17
Payer: COMMERCIAL

## 2023-08-17 VITALS
OXYGEN SATURATION: 96 % | RESPIRATION RATE: 16 BRPM | DIASTOLIC BLOOD PRESSURE: 84 MMHG | HEART RATE: 86 BPM | HEIGHT: 63 IN | SYSTOLIC BLOOD PRESSURE: 132 MMHG | WEIGHT: 175 LBS | BODY MASS INDEX: 31.01 KG/M2 | TEMPERATURE: 97.4 F

## 2023-08-17 DIAGNOSIS — M54.50 CHRONIC MIDLINE LOW BACK PAIN WITHOUT SCIATICA: ICD-10-CM

## 2023-08-17 DIAGNOSIS — Z98.84 HISTORY OF BARIATRIC SURGERY: ICD-10-CM

## 2023-08-17 DIAGNOSIS — K58.2 IRRITABLE BOWEL SYNDROME WITH BOTH CONSTIPATION AND DIARRHEA: ICD-10-CM

## 2023-08-17 DIAGNOSIS — Z78.0 POST-MENOPAUSE: ICD-10-CM

## 2023-08-17 DIAGNOSIS — G89.29 CHRONIC MIDLINE LOW BACK PAIN WITHOUT SCIATICA: ICD-10-CM

## 2023-08-17 DIAGNOSIS — Z91.89 AT RISK FOR NUTRITION DEFICIENCY: ICD-10-CM

## 2023-08-17 DIAGNOSIS — Z12.31 ENCOUNTER FOR SCREENING MAMMOGRAM FOR BREAST CANCER: ICD-10-CM

## 2023-08-17 DIAGNOSIS — E55.9 VITAMIN D DEFICIENCY: Chronic | ICD-10-CM

## 2023-08-17 DIAGNOSIS — R16.0 HEPATOMEGALY: ICD-10-CM

## 2023-08-17 DIAGNOSIS — M54.2 CERVICALGIA: ICD-10-CM

## 2023-08-17 DIAGNOSIS — Z98.84 HISTORY OF ROUX-EN-Y GASTRIC BYPASS: ICD-10-CM

## 2023-08-17 DIAGNOSIS — G47.33 OBSTRUCTIVE SLEEP APNEA SYNDROME: ICD-10-CM

## 2023-08-17 DIAGNOSIS — K21.9 GASTROESOPHAGEAL REFLUX DISEASE, UNSPECIFIED WHETHER ESOPHAGITIS PRESENT: Chronic | ICD-10-CM

## 2023-08-17 DIAGNOSIS — Z12.4 CERVICAL CANCER SCREENING: ICD-10-CM

## 2023-08-17 DIAGNOSIS — E66.01 MORBID OBESITY (HCC): ICD-10-CM

## 2023-08-17 DIAGNOSIS — Z00.00 VISIT FOR ANNUAL HEALTH EXAMINATION: ICD-10-CM

## 2023-08-17 DIAGNOSIS — E78.2 MIXED HYPERLIPIDEMIA: ICD-10-CM

## 2023-08-17 PROCEDURE — 99396 PREV VISIT EST AGE 40-64: CPT | Performed by: FAMILY MEDICINE

## 2023-08-17 PROCEDURE — 3075F SYST BP GE 130 - 139MM HG: CPT | Performed by: FAMILY MEDICINE

## 2023-08-17 PROCEDURE — 3079F DIAST BP 80-89 MM HG: CPT | Performed by: FAMILY MEDICINE

## 2023-08-17 RX ORDER — MONTELUKAST SODIUM
POWDER (GRAM) MISCELLANEOUS
COMMUNITY

## 2023-08-17 RX ORDER — ONDANSETRON 4 MG/1
TABLET, FILM COATED ORAL
Qty: 20 TABLET | Refills: 0 | Status: SHIPPED | OUTPATIENT
Start: 2023-08-17

## 2023-08-17 ASSESSMENT — PATIENT HEALTH QUESTIONNAIRE - PHQ9: CLINICAL INTERPRETATION OF PHQ2 SCORE: 0

## 2023-08-17 NOTE — ASSESSMENT & PLAN NOTE
Repeat fasting labs done  Encouraged healthy diet low in saturated fasts and avoid processed carbohydrates

## 2023-08-17 NOTE — PROGRESS NOTES
Subjective:   Larisa Davis is a 58 y.o. female here today for evaluation and management of:     GERD (gastroesophageal reflux disease)  Has some nausea due to GERD  She did not see GI,   Only sporadic symptoms stays a few weeks and then symptoms resolve. Advised to continue with prilosec when she has symptoms.   Probably should have egd with next colonoscopy.     History of Abhijit-en-Y gastric bypass  Hx of gastric bypass 3 yrs ago.   Doing well, no blood in stool, has occasional episodes of GERD controlled with omeprazole  Will check mg, vit B and folate labs with next labs.     Enlarged liver  Will recheck liver US  She has no abdominal pain or jaundice  She has 2-3 glasses of wine a day  advised on current recommendations    Cervical cancer screening  Scheduled for pap  No hx of abnormal paps    Cervicalgia  Chronic condition, worse with stress  Muscle relaxers and NSAIdS help if severe     Irritable bowel syndrome with both constipation and diarrhea  Improved with metamucil  No diarrhea    Chronic midline low back pain without sciatica  She finds worse pain with cooler air from fan  Pain improved with NSAIDS, muscle relaxant    Mixed hyperlipidemia  Repeat fasting labs done  Encouraged healthy diet low in saturated fasts and avoid processed carbohydrates    Morbid obesity (HCC)  Improving after gastric bypass surgery    Sleep apnea  Resolved after weight loss with bariatric surgery    Post-menopause  Menopause for 14 yrs.   Last dexa was 10 yrs ago, normal, repeat dexa ordered.     Vitamin D deficiency  Continue vit d supplement    Visit for annual health examination  Screenings:   Up to date on her colonoscopy last one was 2018 with 10 year recall  Order for mammogram provided  Scheduled for pap, no hx of abn pap    Preventive:   Continue ca, vit d as she is post menopausal  Continue sunscreen, seat belt, healthy diet    Social:   Does not smoke  2 glasses of wine daily: advised to avoid daily  "alcohol  No abuse in a safe relationship  36 years.                  Current medicines (including changes today)  Current Outpatient Medications   Medication Sig Dispense Refill    ondansetron (ZOFRAN) 4 MG Tab tablet Take 1 tablt by mouth every 4 hours as needed for nausea or vomiting 20 Tablet 0    diclofenac DR (VOLTAREN) 75 MG Tablet Delayed Response Take 1 Tablet by mouth 2 times a day as needed (p[ain). PAIN 60 Tablet 3    cyclobenzaprine (FLEXERIL) 5 mg tablet Take 1 Tablet by mouth 3 times a day as needed for Muscle Spasms. 90 Tablet 1    EPINEPHrine (EPIPEN) 0.3 MG/0.3ML Solution Auto-injector solution for injection FOR SEVERE ALLERGIC REACTION      albuterol 108 (90 Base) MCG/ACT Aero Soln inhalation aerosol Inhale 2 Puffs by mouth every four hours as needed for Shortness of Breath. 1 Inhaler 5    Multiple Vitamin (MULTI VITAMIN DAILY PO) Take  by mouth.      Cholecalciferol (VITAMIN D PO) Take  by mouth.      Magnesium 100 MG Cap Take  by mouth.      Calcium Carbonate-Vitamin D (CALCIUM + D PO) Take  by mouth.      Montelukast Sodium Powder        No current facility-administered medications for this visit.     She  has a past medical history of Fatigue (12/28/2010), GERD (gastroesophageal reflux disease) (12/28/2010), Hypertension, Hypoglycemia (12/28/2010), Irregular menses (12/28/2010), Lactose intolerance (12/28/2010), Menopause, premature (7/24/2012), Menorrhagia (1/18/2011), Nocturnal muscle spasm (12/28/2010), Primary snoring (12/28/2010), and Unspecified vitamin D deficiency (12/28/2010).    ROS  No chest pain, no shortness of breath, no abdominal pain       Objective:     /84   Pulse 86   Temp 36.3 °C (97.4 °F) (Temporal)   Resp 16   Ht 1.6 m (5' 3\")   Wt 79.4 kg (175 lb)   SpO2 96%  Body mass index is 31 kg/m².   Physical Exam:  Constitutional: Alert, no distress.  Skin: Warm, dry, good turgor, no rashes in visible areas.  Eye: Equal, round and reactive, conjunctiva clear, " lids normal.  ENMT: Lips without lesions, good dentition, oropharynx clear.  Neck: Trachea midline, no masses, no thyromegaly. No cervical or supraclavicular lymphadenopathy  Respiratory: Unlabored respiratory effort, lungs clear to auscultation, no wheezes, no ronchi.  Cardiovascular: Normal S1, S2, no murmur, no edema.  Abdomen: Soft, non-tender, no masses, no hepatosplenomegaly.  Psych: Alert and oriented x3, normal affect and mood.        Assessment and Plan:   The following treatment plan was discussed    1. At risk for nutrition deficiency  - VITAMIN B12; Future  - IRON/TOTAL IRON BIND; Future  - FOLATE; Future  - MAGNESIUM; Future    2. History of bariatric surgery  - VITAMIN B12; Future  - IRON/TOTAL IRON BIND; Future  - FOLATE; Future  - MAGNESIUM; Future    3. Encounter for screening mammogram for breast cancer  - MA-SCREENING MAMMO BILAT W/TOMOSYNTHESIS W/CAD; Future    4. Gastroesophageal reflux disease, unspecified whether esophagitis present    5. History of Abhijit-en-Y gastric bypass    6. Hepatomegaly  - US-RUQ; Future    7. Cervical cancer screening    8. Cervicalgia    9. Irritable bowel syndrome with both constipation and diarrhea    10. Chronic midline low back pain without sciatica    11. Mixed hyperlipidemia    12. Morbid obesity (HCC)    13. Obstructive sleep apnea syndrome    14. Post-menopause  - DS-BONE DENSITY STUDY (DEXA); Future    15. Vitamin D deficiency    16. Visit for annual health examination    Other orders  - Montelukast Sodium Powder  - ondansetron (ZOFRAN) 4 MG Tab tablet; Take 1 tablt by mouth every 4 hours as needed for nausea or vomiting  Dispense: 20 Tablet; Refill: 0      Followup: Return for As Scheduled PAP.

## 2023-08-17 NOTE — ASSESSMENT & PLAN NOTE
Will recheck liver US  She has no abdominal pain or jaundice  She has 2-3 glasses of wine a day  advised on current recommendations

## 2023-08-17 NOTE — ASSESSMENT & PLAN NOTE
Screenings:   Up to date on her colonoscopy last one was 2018 with 10 year recall  Order for mammogram provided  Scheduled for pap, no hx of abn pap    Preventive:   Continue ca, vit d as she is post menopausal  Continue sunscreen, seat belt, healthy diet    Social:   Does not smoke  2 glasses of wine daily: advised to avoid daily alcohol  No abuse in a safe relationship  36 years.

## 2023-08-17 NOTE — ASSESSMENT & PLAN NOTE
Hx of gastric bypass 3 yrs ago.   Doing well, no blood in stool, has occasional episodes of GERD controlled with omeprazole  Will check mg, vit B and folate labs with next labs.

## 2023-08-17 NOTE — ASSESSMENT & PLAN NOTE
Has some nausea due to GERD  She did not see GI,   Only sporadic symptoms stays a few weeks and then symptoms resolve. Advised to continue with prilosec when she has symptoms.   Probably should have egd with next colonoscopy.

## 2023-09-07 ENCOUNTER — HOSPITAL ENCOUNTER (OUTPATIENT)
Dept: RADIOLOGY | Facility: MEDICAL CENTER | Age: 58
End: 2023-09-07
Attending: FAMILY MEDICINE
Payer: COMMERCIAL

## 2023-09-07 DIAGNOSIS — Z12.31 ENCOUNTER FOR SCREENING MAMMOGRAM FOR BREAST CANCER: ICD-10-CM

## 2023-09-07 PROCEDURE — 77063 BREAST TOMOSYNTHESIS BI: CPT

## 2023-09-16 ENCOUNTER — HOSPITAL ENCOUNTER (OUTPATIENT)
Dept: RADIOLOGY | Facility: MEDICAL CENTER | Age: 58
End: 2023-09-16
Attending: FAMILY MEDICINE
Payer: COMMERCIAL

## 2023-09-16 DIAGNOSIS — R16.0 HEPATOMEGALY: ICD-10-CM

## 2023-09-16 PROCEDURE — 76705 ECHO EXAM OF ABDOMEN: CPT

## 2023-09-25 ENCOUNTER — APPOINTMENT (OUTPATIENT)
Dept: RADIOLOGY | Facility: MEDICAL CENTER | Age: 58
End: 2023-09-25
Attending: FAMILY MEDICINE
Payer: COMMERCIAL

## 2023-10-07 NOTE — ASSESSMENT & PLAN NOTE
Patient reports this is been a chronic problem, worsening in the last 6 months.  Patient reports CPAP not fitting well.  Wakes frequently.  Will refer patient to sleep studies for further evaluation.  We will also get updated lab work.     Lissett Hanson

## 2024-08-09 PROBLEM — S83.231A COMPLEX TEAR OF MEDIAL MENISCUS OF RIGHT KNEE: Status: ACTIVE | Noted: 2024-08-09

## 2024-08-09 PROBLEM — M25.561 RIGHT KNEE PAIN: Status: ACTIVE | Noted: 2024-08-09

## 2024-08-22 DIAGNOSIS — M54.50 CHRONIC MIDLINE LOW BACK PAIN WITHOUT SCIATICA: ICD-10-CM

## 2024-08-22 DIAGNOSIS — G89.29 CHRONIC MIDLINE LOW BACK PAIN WITHOUT SCIATICA: ICD-10-CM

## 2024-08-22 RX ORDER — DICLOFENAC SODIUM 75 MG/1
75 TABLET, DELAYED RELEASE ORAL 2 TIMES DAILY PRN
Qty: 60 TABLET | Refills: 3 | Status: SHIPPED | OUTPATIENT
Start: 2024-08-22

## 2024-08-22 NOTE — TELEPHONE ENCOUNTER
[id number]  last OV      Received request via: Patient    Was the patient seen in the last year in this department? Yes    Does the patient have an active prescription (recently filled or refills available) for medication(s) requested? No    [email or web address]     Does the patient have skilled nursing Plus and need 100-day supply? (This applies to ALL medications) Patient does not have SCP

## 2024-08-23 DIAGNOSIS — M54.2 CERVICALGIA: ICD-10-CM

## 2024-08-23 NOTE — TELEPHONE ENCOUNTER
Received request via: Pharmacy    Was the patient seen in the last year in this department? No    Does the patient have an active prescription (recently filled or refills available) for medication(s) requested? No    [email or web address]     Does the patient have [facility] Plus and need 100-day supply? (This applies to ALL medications) Patient does not have SCP

## 2024-08-28 RX ORDER — CYCLOBENZAPRINE HCL 5 MG
5 TABLET ORAL 3 TIMES DAILY PRN
Qty: 90 TABLET | Refills: 3 | Status: SHIPPED | OUTPATIENT
Start: 2024-08-28

## 2024-12-30 ENCOUNTER — OFFICE VISIT (OUTPATIENT)
Dept: MEDICAL GROUP | Facility: MEDICAL CENTER | Age: 59
End: 2024-12-30
Payer: COMMERCIAL

## 2024-12-30 VITALS
TEMPERATURE: 97 F | OXYGEN SATURATION: 98 % | DIASTOLIC BLOOD PRESSURE: 82 MMHG | HEIGHT: 62 IN | SYSTOLIC BLOOD PRESSURE: 118 MMHG | HEART RATE: 78 BPM | WEIGHT: 178 LBS | BODY MASS INDEX: 32.76 KG/M2

## 2024-12-30 DIAGNOSIS — J02.9 SORE THROAT: ICD-10-CM

## 2024-12-30 DIAGNOSIS — R11.0 NAUSEA: ICD-10-CM

## 2024-12-30 DIAGNOSIS — R59.0 CERVICAL LYMPHADENOPATHY: ICD-10-CM

## 2024-12-30 RX ORDER — ONDANSETRON 4 MG/1
TABLET, FILM COATED ORAL
Qty: 20 TABLET | Refills: 0 | Status: SHIPPED | OUTPATIENT
Start: 2024-12-30

## 2024-12-30 RX ORDER — AZITHROMYCIN 250 MG/1
TABLET, FILM COATED ORAL
Qty: 6 TABLET | Refills: 0 | Status: SHIPPED | OUTPATIENT
Start: 2024-12-30 | End: 2025-01-04

## 2024-12-31 NOTE — PROGRESS NOTES
Subjective:     History of Present Illness  The patient is a 59-year-old female seen in follow-up for a swollen lymph node.    She reports an incident a few days prior where she consumed sesame seeds in her salad, one of which seemed to lodge in her gland. This caused discomfort, leading her to attempt removal with a Q-tip, resulting in bleeding. Subsequently, she has experienced swelling and pain in the gland, particularly when consuming coffee.     She also reports a concurrent cough, which she attributes to a recent viral infection. She is uncertain if these symptoms are related or coincidental. She recalls experiencing fever, chills, and sweating earlier in the day, but these symptoms have since subsided. She does not report any headaches, sinus pressure, shortness of breath, or wheezing. She also mentions occasional bleeding from her throat and a sensation of movement when she swallows. She has been prescribed a rescue inhaler for use during coughing episodes by her PCP. She also reports snoring and waking up with chapped lips, which she believes may have caused lightheadedness. SheShe took DayQuil as an anti-inflammatory measure.    She has been unable to refill her Zofran prescription due to her primary care physician's unavailability. She reports frequent nausea, which she manages with sparing use of Zofran. She underwent bariatric surgery 3 years ago and has since experienced persistent nausea.    FAMILY HISTORY  Her niece has breast cancer.    ALLERGIES  The patient has no known allergies.    MEDICATIONS  Current: Flexeril, Voltaren, Zofran, EpiPen, multivitamin, magnesium, calcium, DayQuil    Results  none    Current medicines (including changes today)  Current Outpatient Medications   Medication Sig Dispense Refill    ondansetron (ZOFRAN) 4 MG Tab tablet Take 1 tablt by mouth every 4 hours as needed for nausea or vomiting 20 Tablet 0    azithromycin (ZITHROMAX) 250 MG Tab 2 tabs by mouth day 1, 1 tab by  mouth days 2-5 6 Tablet 0    cyclobenzaprine (FLEXERIL) 5 mg tablet Take 1 Tablet by mouth 3 times a day as needed for Muscle Spasms. 90 Tablet 3    diclofenac DR (VOLTAREN) 75 MG Tablet Delayed Response TAKE 1 TABLET BY MOUTH TWICE DAILY AS NEEDED FOR PAIN 60 Tablet 3    EPINEPHrine (EPIPEN) 0.3 MG/0.3ML Solution Auto-injector solution for injection FOR SEVERE ALLERGIC REACTION      albuterol 108 (90 Base) MCG/ACT Aero Soln inhalation aerosol Inhale 2 Puffs by mouth every four hours as needed for Shortness of Breath. 1 Inhaler 5    Multiple Vitamin (MULTI VITAMIN DAILY PO) Take  by mouth.      Magnesium 100 MG Cap Take  by mouth.      Calcium Carbonate-Vitamin D (CALCIUM + D PO) Take  by mouth.       No current facility-administered medications for this visit.     She  has a past medical history of Fatigue (12/28/2010), GERD (gastroesophageal reflux disease) (12/28/2010), Hypertension, Hypoglycemia (12/28/2010), Irregular menses (12/28/2010), Lactose intolerance (12/28/2010), Menopause, premature (7/24/2012), Menorrhagia (1/18/2011), Nocturnal muscle spasm (12/28/2010), Primary snoring (12/28/2010), and Unspecified vitamin D deficiency (12/28/2010).    ROS   Review of Systems   Constitutional: Negative.  Negative for weight loss, malaise/fatigue and diaphoresis.   HENT: Negative.  Negative for hearing loss, ear pain, nosebleeds, neck pain, tinnitus and ear discharge.    Respiratory: Negative.  Negative for hemoptysis, sputum production, shortness of breath, wheezing and stridor.    Cardiovascular: Negative.  Negative for chest pain, palpitations, orthopnea, claudication, leg swelling and PND.   Gastrointestinal: denies nausea, vomiting, diarrhea, constipation, heartburn, melena or hematochezia.  Genitourinary: Denies dysuria, hematuria, urinary incontinence, frequency or urgency.    Musculoskeletal: Negative.  Negative for myalgias and back pain.   Neurological: Negative.  Negative for dizziness, tingling, tremors,  "weakness and headaches.   Psych:  Denies depression, anxiety or insomnia.  All other systems reviewed and are negative.       Objective:     /82   Pulse 78   Temp 36.1 °C (97 °F) (Temporal)   Ht 1.575 m (5' 2\")   Wt 80.7 kg (178 lb)   SpO2 98%  Body mass index is 32.56 kg/m².   Physical Exam  Lungs sound normal.    Vital Signs  Blood pressure is normal. Heart rate is normal. No fever. Breathing is normal.  Physical Exam   Vitals reviewed.  Constitutional: oriented to person, place, and time. appears well-developed and well-nourished. No distress.   HENT:  Head: Normocephalic and atraumatic. Right Ear: External ear normal. Left Ear: External ear normal. Nose: Nose normal. Mouth/Throat: Oropharynx is clear and moist with left side of throat erythematous and mildly edematous.  Ears appear normal. Throat is red.   No oropharyngeal exudate.  abhinav tm wnl.  Eyes: Right eye exhibits no discharge. Left eye exhibits no discharge. No scleral icterus.  Neck: No JVD present.  Cardiovascular: Normal rate, regular rhythm, normal heart sounds and intact distal pulses.  Exam reveals no gallop and no friction rub.  No murmur heard.  No carotid bruits.   Pulmonary/Chest: Effort normal and breath sounds normal. No stridor. No respiratory distress. no wheezes or rales. exhibits no tenderness.   Musculoskeletal: Normal range of motion.   Lymphadenopathy: no supraclavicular adenopathy.  Abhinav cervical lymphadenopathy with left node larger and more tender to palp.  Neurological: alert and oriented to person, place, and time. exhibits normal muscle tone. Coordination normal.   Skin: Skin is warm and dry. no diaphoresis.   Psychiatric: normal mood and affect. behavior is normal.     Assessment and Plan:   The following treatment plan was discussed    Assessment & Plan  1. Left cervical lymphadenopathy.  The condition may be due to a blocked parotid gland or inflamed lymph nodes. It could also be a secondary bacterial infection. The " possibility of parotiditis is unlikely due to the size of the swelling. A course of Z-Bryan (azithromycin) will be initiated. A neck ultrasound has been ordered to further investigate the cause of the swelling if not improved with the antibiotic. If there is no improvement with the antibiotic treatment, she should inform us so that we can reassess the situation.    2. Nausea.  A prescription for Zofran (ondansetron) has been provided to manage her nausea. She is advised to use it sparingly as needed.    PROCEDURE  The patient underwent bariatric surgery 3 years ago.      ORDERS:  1. Sore throat    - azithromycin (ZITHROMAX) 250 MG Tab; 2 tabs by mouth day 1, 1 tab by mouth days 2-5  Dispense: 6 Tablet; Refill: 0  - US-SOFT TISSUES OF HEAD - NECK; Future    2. Cervical lymphadenopathy    - azithromycin (ZITHROMAX) 250 MG Tab; 2 tabs by mouth day 1, 1 tab by mouth days 2-5  Dispense: 6 Tablet; Refill: 0  - US-SOFT TISSUES OF HEAD - NECK; Future    3. Nausea    - ondansetron (ZOFRAN) 4 MG Tab tablet; Take 1 tablt by mouth every 4 hours as needed for nausea or vomiting  Dispense: 20 Tablet; Refill: 0          Please note that this dictation was created using voice recognition software. I have made every reasonable attempt to correct obvious errors, but I expect that there are errors of grammar and possibly content that I did not discover before finalizing the note.      Attestation      Verbal consent was acquired by the patient to use [email or web address] ambient listening note generation during this visit Yes

## 2025-01-27 ENCOUNTER — PATIENT MESSAGE (OUTPATIENT)
Dept: MEDICAL GROUP | Facility: PHYSICIAN GROUP | Age: 60
End: 2025-01-27

## 2025-01-27 ENCOUNTER — TELEPHONE (OUTPATIENT)
Dept: URGENT CARE | Facility: PHYSICIAN GROUP | Age: 60
End: 2025-01-27

## 2025-01-27 DIAGNOSIS — E78.2 MIXED HYPERLIPIDEMIA: ICD-10-CM

## 2025-01-27 DIAGNOSIS — G89.29 CHRONIC MIDLINE LOW BACK PAIN WITHOUT SCIATICA: ICD-10-CM

## 2025-01-27 DIAGNOSIS — E55.9 VITAMIN D DEFICIENCY: ICD-10-CM

## 2025-01-27 DIAGNOSIS — M54.50 CHRONIC MIDLINE LOW BACK PAIN WITHOUT SCIATICA: ICD-10-CM

## 2025-01-27 DIAGNOSIS — G47.33 OBSTRUCTIVE SLEEP APNEA SYNDROME: ICD-10-CM

## 2025-01-27 DIAGNOSIS — R73.01 ELEVATED FASTING GLUCOSE: ICD-10-CM

## 2025-01-29 ENCOUNTER — APPOINTMENT (OUTPATIENT)
Dept: MEDICAL GROUP | Facility: PHYSICIAN GROUP | Age: 60
End: 2025-01-29
Payer: COMMERCIAL

## 2025-01-29 ENCOUNTER — HOSPITAL ENCOUNTER (OUTPATIENT)
Dept: LAB | Facility: MEDICAL CENTER | Age: 60
End: 2025-01-29
Attending: FAMILY MEDICINE
Payer: COMMERCIAL

## 2025-01-29 VITALS
HEIGHT: 63 IN | HEART RATE: 110 BPM | TEMPERATURE: 98.6 F | BODY MASS INDEX: 31.89 KG/M2 | SYSTOLIC BLOOD PRESSURE: 120 MMHG | RESPIRATION RATE: 16 BRPM | OXYGEN SATURATION: 99 % | WEIGHT: 180 LBS | DIASTOLIC BLOOD PRESSURE: 80 MMHG

## 2025-01-29 DIAGNOSIS — Z12.11 COLON CANCER SCREENING: ICD-10-CM

## 2025-01-29 DIAGNOSIS — Z12.31 ENCOUNTER FOR SCREENING MAMMOGRAM FOR BREAST CANCER: ICD-10-CM

## 2025-01-29 DIAGNOSIS — K76.0 FATTY LIVER: ICD-10-CM

## 2025-01-29 DIAGNOSIS — Z12.4 CERVICAL CANCER SCREENING: ICD-10-CM

## 2025-01-29 DIAGNOSIS — M54.50 CHRONIC MIDLINE LOW BACK PAIN WITHOUT SCIATICA: ICD-10-CM

## 2025-01-29 DIAGNOSIS — E55.9 VITAMIN D DEFICIENCY: ICD-10-CM

## 2025-01-29 DIAGNOSIS — G47.33 OBSTRUCTIVE SLEEP APNEA SYNDROME: ICD-10-CM

## 2025-01-29 DIAGNOSIS — K21.9 GASTROESOPHAGEAL REFLUX DISEASE, UNSPECIFIED WHETHER ESOPHAGITIS PRESENT: ICD-10-CM

## 2025-01-29 DIAGNOSIS — Z23 NEED FOR VACCINATION: ICD-10-CM

## 2025-01-29 DIAGNOSIS — R73.01 ELEVATED FASTING GLUCOSE: ICD-10-CM

## 2025-01-29 DIAGNOSIS — Z00.00 VISIT FOR ANNUAL HEALTH EXAMINATION: ICD-10-CM

## 2025-01-29 DIAGNOSIS — R79.89 ELEVATED LFTS: ICD-10-CM

## 2025-01-29 DIAGNOSIS — R11.0 NAUSEA: ICD-10-CM

## 2025-01-29 DIAGNOSIS — E78.2 MIXED HYPERLIPIDEMIA: ICD-10-CM

## 2025-01-29 DIAGNOSIS — Z98.84 HISTORY OF ROUX-EN-Y GASTRIC BYPASS: ICD-10-CM

## 2025-01-29 DIAGNOSIS — T17.320D CHOKING DUE TO FOOD IN LARYNX, SUBSEQUENT ENCOUNTER: ICD-10-CM

## 2025-01-29 DIAGNOSIS — Z12.83 SKIN CANCER SCREENING: ICD-10-CM

## 2025-01-29 DIAGNOSIS — Z98.84 HISTORY OF BARIATRIC SURGERY: ICD-10-CM

## 2025-01-29 DIAGNOSIS — G89.29 CHRONIC MIDLINE LOW BACK PAIN WITHOUT SCIATICA: ICD-10-CM
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
## 2025-01-29 DIAGNOSIS — Z91.89 AT RISK FOR NUTRITION DEFICIENCY: ICD-10-CM

## 2025-01-29 LAB
BASOPHILS # BLD AUTO: 1 % (ref 0–1.8)
BASOPHILS # BLD: 0.04 K/UL (ref 0–0.12)
EOSINOPHIL # BLD AUTO: 0.08 K/UL (ref 0–0.51)
EOSINOPHIL NFR BLD: 1.9 % (ref 0–6.9)
ERYTHROCYTE [DISTWIDTH] IN BLOOD BY AUTOMATED COUNT: 43.8 FL (ref 35.9–50)
EST. AVERAGE GLUCOSE BLD GHB EST-MCNC: 120 MG/DL
HBA1C MFR BLD: 5.8 % (ref 4–5.6)
HCT VFR BLD AUTO: 43.4 % (ref 37–47)
HGB BLD-MCNC: 14.5 G/DL (ref 12–16)
IMM GRANULOCYTES # BLD AUTO: 0.02 K/UL (ref 0–0.11)
IMM GRANULOCYTES NFR BLD AUTO: 0.5 % (ref 0–0.9)
LYMPHOCYTES # BLD AUTO: 1.75 K/UL (ref 1–4.8)
LYMPHOCYTES NFR BLD: 42.3 % (ref 22–41)
MCH RBC QN AUTO: 32.2 PG (ref 27–33)
MCHC RBC AUTO-ENTMCNC: 33.4 G/DL (ref 32.2–35.5)
MCV RBC AUTO: 96.2 FL (ref 81.4–97.8)
MONOCYTES # BLD AUTO: 0.34 K/UL (ref 0–0.85)
MONOCYTES NFR BLD AUTO: 8.2 % (ref 0–13.4)
NEUTROPHILS # BLD AUTO: 1.91 K/UL (ref 1.82–7.42)
NEUTROPHILS NFR BLD: 46.1 % (ref 44–72)
NRBC # BLD AUTO: 0 K/UL
NRBC BLD-RTO: 0 /100 WBC (ref 0–0.2)
PLATELET # BLD AUTO: 220 K/UL (ref 164–446)
PMV BLD AUTO: 9.8 FL (ref 9–12.9)
RBC # BLD AUTO: 4.51 M/UL (ref 4.2–5.4)
WBC # BLD AUTO: 4.1 K/UL (ref 4.8–10.8)

## 2025-01-29 PROCEDURE — 36415 COLL VENOUS BLD VENIPUNCTURE: CPT

## 2025-01-29 PROCEDURE — 80061 LIPID PANEL: CPT

## 2025-01-29 PROCEDURE — 80053 COMPREHEN METABOLIC PANEL: CPT

## 2025-01-29 PROCEDURE — 85025 COMPLETE CBC W/AUTO DIFF WBC: CPT

## 2025-01-29 PROCEDURE — 82306 VITAMIN D 25 HYDROXY: CPT

## 2025-01-29 PROCEDURE — 83036 HEMOGLOBIN GLYCOSYLATED A1C: CPT

## 2025-01-29 RX ORDER — OXYBUTYNIN CHLORIDE 10 MG/1
10 TABLET, EXTENDED RELEASE ORAL DAILY
Qty: 90 TABLET | Refills: 1 | Status: SHIPPED | OUTPATIENT
Start: 2025-01-29

## 2025-01-29 RX ORDER — PANTOPRAZOLE SODIUM 20 MG/1
20 TABLET, DELAYED RELEASE ORAL DAILY
Qty: 90 TABLET | Refills: 3 | Status: SHIPPED | OUTPATIENT
Start: 2025-01-29

## 2025-01-29 RX ORDER — ONDANSETRON 4 MG/1
TABLET, FILM COATED ORAL
Qty: 20 TABLET | Refills: 0 | Status: SHIPPED | OUTPATIENT
Start: 2025-01-29

## 2025-01-29 SDOH — ECONOMIC STABILITY: TRANSPORTATION INSECURITY
IN THE PAST 12 MONTHS, HAS THE LACK OF TRANSPORTATION KEPT YOU FROM MEDICAL APPOINTMENTS OR FROM GETTING MEDICATIONS?: NO

## 2025-01-29 SDOH — ECONOMIC STABILITY: INCOME INSECURITY: IN THE LAST 12 MONTHS, WAS THERE A TIME WHEN YOU WERE NOT ABLE TO PAY THE MORTGAGE OR RENT ON TIME?: NO

## 2025-01-29 SDOH — ECONOMIC STABILITY: HOUSING INSECURITY
IN THE LAST 12 MONTHS, WAS THERE A TIME WHEN YOU DID NOT HAVE A STEADY PLACE TO SLEEP OR SLEPT IN A SHELTER (INCLUDING NOW)?: NO

## 2025-01-29 SDOH — ECONOMIC STABILITY: TRANSPORTATION INSECURITY
IN THE PAST 12 MONTHS, HAS LACK OF RELIABLE TRANSPORTATION KEPT YOU FROM MEDICAL APPOINTMENTS, MEETINGS, WORK OR FROM GETTING THINGS NEEDED FOR DAILY LIVING?: NO

## 2025-01-29 SDOH — ECONOMIC STABILITY: TRANSPORTATION INSECURITY
IN THE PAST 12 MONTHS, HAS LACK OF TRANSPORTATION KEPT YOU FROM MEETINGS, WORK, OR FROM GETTING THINGS NEEDED FOR DAILY LIVING?: NO

## 2025-01-29 SDOH — ECONOMIC STABILITY: FOOD INSECURITY: WITHIN THE PAST 12 MONTHS, YOU WORRIED THAT YOUR FOOD WOULD RUN OUT BEFORE YOU GOT MONEY TO BUY MORE.: NEVER TRUE

## 2025-01-29 SDOH — ECONOMIC STABILITY: FOOD INSECURITY: WITHIN THE PAST 12 MONTHS, THE FOOD YOU BOUGHT JUST DIDN'T LAST AND YOU DIDN'T HAVE MONEY TO GET MORE.: NEVER TRUE

## 2025-01-29 SDOH — ECONOMIC STABILITY: INCOME INSECURITY: HOW HARD IS IT FOR YOU TO PAY FOR THE VERY BASICS LIKE FOOD, HOUSING, MEDICAL CARE, AND HEATING?: NOT VERY HARD

## 2025-01-29 SDOH — HEALTH STABILITY: PHYSICAL HEALTH
ON AVERAGE, HOW MANY DAYS PER WEEK DO YOU ENGAGE IN MODERATE TO STRENUOUS EXERCISE (LIKE A BRISK WALK)?: PATIENT DECLINED

## 2025-01-29 SDOH — HEALTH STABILITY: PHYSICAL HEALTH: ON AVERAGE, HOW MANY MINUTES DO YOU ENGAGE IN EXERCISE AT THIS LEVEL?: PATIENT DECLINED

## 2025-01-29 ASSESSMENT — SOCIAL DETERMINANTS OF HEALTH (SDOH)
HOW OFTEN DO YOU ATTENT MEETINGS OF THE CLUB OR ORGANIZATION YOU BELONG TO?: MORE THAN 4 TIMES PER YEAR
HOW OFTEN DO YOU HAVE A DRINK CONTAINING ALCOHOL: 4 OR MORE TIMES A WEEK
DO YOU BELONG TO ANY CLUBS OR ORGANIZATIONS SUCH AS CHURCH GROUPS UNIONS, FRATERNAL OR ATHLETIC GROUPS, OR SCHOOL GROUPS?: YES
HOW OFTEN DO YOU GET TOGETHER WITH FRIENDS OR RELATIVES?: ONCE A WEEK
WITHIN THE PAST 12 MONTHS, YOU WORRIED THAT YOUR FOOD WOULD RUN OUT BEFORE YOU GOT THE MONEY TO BUY MORE: NEVER TRUE
HOW OFTEN DO YOU ATTEND CHURCH OR RELIGIOUS SERVICES?: MORE THAN 4 TIMES PER YEAR
HOW HARD IS IT FOR YOU TO PAY FOR THE VERY BASICS LIKE FOOD, HOUSING, MEDICAL CARE, AND HEATING?: NOT VERY HARD
HOW OFTEN DO YOU ATTEND CHURCH OR RELIGIOUS SERVICES?: MORE THAN 4 TIMES PER YEAR
HOW MANY DRINKS CONTAINING ALCOHOL DO YOU HAVE ON A TYPICAL DAY WHEN YOU ARE DRINKING: PATIENT DECLINED
IN A TYPICAL WEEK, HOW MANY TIMES DO YOU TALK ON THE PHONE WITH FAMILY, FRIENDS, OR NEIGHBORS?: MORE THAN THREE TIMES A WEEK
HOW OFTEN DO YOU GET TOGETHER WITH FRIENDS OR RELATIVES?: ONCE A WEEK
DO YOU BELONG TO ANY CLUBS OR ORGANIZATIONS SUCH AS CHURCH GROUPS UNIONS, FRATERNAL OR ATHLETIC GROUPS, OR SCHOOL GROUPS?: YES
IN THE PAST 12 MONTHS, HAS THE ELECTRIC, GAS, OIL, OR WATER COMPANY THREATENED TO SHUT OFF SERVICE IN YOUR HOME?: NO
IN A TYPICAL WEEK, HOW MANY TIMES DO YOU TALK ON THE PHONE WITH FAMILY, FRIENDS, OR NEIGHBORS?: MORE THAN THREE TIMES A WEEK
HOW OFTEN DO YOU ATTENT MEETINGS OF THE CLUB OR ORGANIZATION YOU BELONG TO?: MORE THAN 4 TIMES PER YEAR
HOW OFTEN DO YOU HAVE SIX OR MORE DRINKS ON ONE OCCASION: PATIENT DECLINED

## 2025-01-29 ASSESSMENT — LIFESTYLE VARIABLES
AUDIT-C TOTAL SCORE: -1
SKIP TO QUESTIONS 9-10: 0
HOW MANY STANDARD DRINKS CONTAINING ALCOHOL DO YOU HAVE ON A TYPICAL DAY: PATIENT DECLINED
HOW OFTEN DO YOU HAVE SIX OR MORE DRINKS ON ONE OCCASION: PATIENT DECLINED
HOW OFTEN DO YOU HAVE A DRINK CONTAINING ALCOHOL: 4 OR MORE TIMES A WEEK

## 2025-01-29 ASSESSMENT — PATIENT HEALTH QUESTIONNAIRE - PHQ9: CLINICAL INTERPRETATION OF PHQ2 SCORE: 0

## 2025-01-29 NOTE — ASSESSMENT & PLAN NOTE
Screenings:   Up to date on her colonoscopy last one was 2015 with 10 year recall due Dec 2025, referral placed.   Order for mammogram provided  Pap up to date    Preventive:   Continue ca, vit d as she is post menopausal  Continue sunscreen, seat belt, healthy diet    Social:   Does not smoke  2 glasses of wine daily: advised to avoid daily alcohol  No abuse in a safe relationship  36 years.     Immz: flu shot provided today.

## 2025-01-29 NOTE — TELEPHONE ENCOUNTER
Received request via: Patient    Was the patient seen in the last year in this department? Yes    Does the patient have an active prescription (recently filled or refills available) for medication(s) requested? No    Ph[facility] Name: [email or web address]     Does the patient have retirement Plus and need 100-day supply? (This applies to ALL medications) Patient does not have SCP

## 2025-01-29 NOTE — PROGRESS NOTES
Subjective:   Larisa Davis is a 60 y.o. female here today for annual physical exam.     Cervical cancer screening  Normal pap and hpv 2021 next pap due [id number]    Visit for annual health examination  Screenings:   Up to date on her colonoscopy last one was 2015 with 10 year recall due Dec 2025, referral placed.   Order for mammogram provided  Pap up to date    Preventive:   Continue ca, vit d as she is post menopausal  Continue sunscreen, seat belt, healthy diet    Social:   Does not smoke  2 glasses of wine daily: advised to avoid daily alcohol  No abuse in a safe relationship  36 years.     Immz: flu shot provided today.                Current medicines (including changes today)  Current Outpatient Medications   Medication Sig Dispense Refill    pantoprazole (PROTONIX) 20 MG tablet Take 1 Tablet by mouth every day. For GERD 90 Tablet 3    oxybutynin SR (DITROPAN-XL) 10 MG CR tablet Take 1 Tablet by mouth every day. For stress and urge incontinence 90 Tablet 1    ondansetron (ZOFRAN) 4 MG Tab tablet Take 1 tablt by mouth every 4 hours as needed for nausea or vomiting 20 Tablet 0    cyclobenzaprine (FLEXERIL) 5 mg tablet Take 1 Tablet by mouth 3 times a day as needed for Muscle Spasms. 90 Tablet 3    diclofenac DR (VOLTAREN) 75 MG Tablet Delayed Response TAKE 1 TABLET BY MOUTH TWICE DAILY AS NEEDED FOR PAIN 60 Tablet 3    EPINEPHrine (EPIPEN) 0.3 MG/0.3ML Solution Auto-injector solution for injection FOR SEVERE ALLERGIC REACTION      albuterol 108 (90 Base) MCG/ACT Aero Soln inhalation aerosol Inhale 2 Puffs by mouth every four hours as needed for Shortness of Breath. 1 Inhaler 5    Multiple Vitamin (MULTI VITAMIN DAILY PO) Take  by mouth.      Magnesium 100 MG Cap Take  by mouth.      Calcium Carbonate-Vitamin D (CALCIUM + D PO) Take  by mouth.       No current facility-administered medications for this visit.     She  has a past medical history of Fatigue (12/28/2010), GERD (gastroesophageal  "reflux disease) (12/28/2010), Hypertension, Hypoglycemia (12/28/2010), Irregular menses (12/28/2010), Lactose intolerance (12/28/2010), Menopause, premature (7/24/2012), Menorrhagia (1/18/2011), Nocturnal muscle spasm (12/28/2010), Primary snoring (12/28/2010), and Unspecified vitamin D deficiency (12/28/2010).    ROS  No chest pain, no shortness of breath, no abdominal pain       Objective:     /80 (BP Location: Left arm, Patient Position: Sitting, BP Cuff Size: Adult long)   Pulse (!) 110   Temp 37 °C (98.6 °F) (Temporal)   Resp 16   Ht 1.588 m (5' 2.5\")   Wt 81.6 kg (180 lb)   SpO2 99%  Body mass index is 32.4 kg/m².   Physical Exam:  Constitutional: Alert, no distress.  Skin: Warm, dry, good turgor, no rashes in visible areas.  Eye: Equal, round and reactive, conjunctiva clear, lids normal.  ENMT: Lips without lesions, good dentition, oropharynx clear.  Neck: Trachea midline, no masses, no thyromegaly. No cervical or supraclavicular lymphadenopathy  Respiratory: Unlabored respiratory effort, lungs clear to auscultation, no wheezes, no ronchi.  Cardiovascular: Normal S1, S2, no murmur, no edema.  Abdomen: Soft, non-tender, no masses, no hepatosplenomegaly.  Psych: Alert and oriented x3, normal affect and mood.    Assessment and Plan:   The following treatment plan was discussed    1. Encounter for screening mammogram for breast cancer  - MA-SCREENING MAMMO BILAT W/CAD; Future    2. Need for vaccination  - INFLUENZA VACCINE TRI INJ (PF)     3. Cervical cancer screening    4. Visit for annual health examination    5. Colon cancer screening  - Referral to GI for Colonoscopy    6. Gastroesophageal reflux disease, unspecified whether esophagitis present  - Referral to Gastroenterology    7. Choking due to food in larynx, subsequent encounter  - Referral to Gastroenterology    8. Skin cancer screening  - Referral to Dermatology    9. Fatty liver  - US-RUQ; Future    10. Elevated LFTs  - US-RUQ; " Future    11. Nausea  - ondansetron (ZOFRAN) 4 MG Tab tablet; Take 1 tablt by mouth every 4 hours as needed for nausea or vomiting  Dispense: 20 Tablet; Refill: 0  - TSH WITH REFLEX TO FT4; Future    12. At risk for nutrition deficiency  - VITAMIN B12; Future  - IRON/TOTAL IRON BIND; Future  - FOLATE; Future  - VITAMIN A; Future  - VITAMIN E; Future  - VITAMIN K; Future  - VITAMIN C SERUM; Future  - VITAMIN B1; Future  - VITAMIN B6; Future  - ZINC SERUM; Future  - SELENIUM, SERUM; Future  - MAGNESIUM; Future    13. History of bariatric surgery  - VITAMIN B12; Future  - IRON/TOTAL IRON BIND; Future  - FOLATE; Future  - VITAMIN A; Future  - VITAMIN E; Future  - VITAMIN K; Future  - VITAMIN C SERUM; Future  - VITAMIN B1; Future  - VITAMIN B6; Future  - ZINC SERUM; Future  - SELENIUM, SERUM; Future  - MAGNESIUM; Future    14. History of Abhijit-en-Y gastric bypass  - VITAMIN B12; Future  - IRON/TOTAL IRON BIND; Future  - FOLATE; Future  - VITAMIN A; Future  - VITAMIN E; Future  - VITAMIN K; Future  - VITAMIN C SERUM; Future  - VITAMIN B1; Future  - VITAMIN B6; Future  - ZINC SERUM; Future  - SELENIUM, SERUM; Future  - MAGNESIUM; Future    Other orders  - pantoprazole (PROTONIX) 20 MG tablet; Take 1 Tablet by mouth every day. For GERD  Dispense: 90 Tablet; Refill: 3  - oxybutynin SR (DITROPAN-XL) 10 MG CR tablet; Take 1 Tablet by mouth every day. For stress and urge incontinence  Dispense: 90 Tablet; Refill: 1      Followup: Return in about 6 months (around 7/29/2025) for pap.

## 2025-01-30 LAB
25(OH)D3 SERPL-MCNC: 54 NG/ML (ref 30–100)
ALBUMIN SERPL BCP-MCNC: 4.5 G/DL (ref 3.2–4.9)
ALBUMIN/GLOB SERPL: 1.7 G/DL
ALP SERPL-CCNC: 60 U/L (ref 30–99)
ALT SERPL-CCNC: 18 U/L (ref 2–50)
ANION GAP SERPL CALC-SCNC: 11 MMOL/L (ref 7–16)
AST SERPL-CCNC: 20 U/L (ref 12–45)
BILIRUB SERPL-MCNC: 0.4 MG/DL (ref 0.1–1.5)
BUN SERPL-MCNC: 15 MG/DL (ref 8–22)
CALCIUM ALBUM COR SERPL-MCNC: 9.4 MG/DL (ref 8.5–10.5)
CALCIUM SERPL-MCNC: 9.8 MG/DL (ref 8.5–10.5)
CHLORIDE SERPL-SCNC: 106 MMOL/L (ref 96–112)
CHOLEST SERPL-MCNC: 262 MG/DL (ref 100–199)
CO2 SERPL-SCNC: 27 MMOL/L (ref 20–33)
CREAT SERPL-MCNC: 0.88 MG/DL (ref 0.5–1.4)
FASTING STATUS PATIENT QL REPORTED: NORMAL
GFR SERPLBLD CREATININE-BSD FMLA CKD-EPI: 76 ML/MIN/1.73 M 2
GLOBULIN SER CALC-MCNC: 2.6 G/DL (ref 1.9–3.5)
GLUCOSE SERPL-MCNC: 94 MG/DL (ref 65–99)
HDLC SERPL-MCNC: 65 MG/DL
LDLC SERPL CALC-MCNC: 170 MG/DL
POTASSIUM SERPL-SCNC: 4.4 MMOL/L (ref 3.6–5.5)
PROT SERPL-MCNC: 7.1 G/DL (ref 6–8.2)
SODIUM SERPL-SCNC: 144 MMOL/L (ref 135–145)
TRIGL SERPL-MCNC: 133 MG/DL (ref 0–149)

## 2025-02-10 ENCOUNTER — HOSPITAL ENCOUNTER (OUTPATIENT)
Dept: RADIOLOGY | Facility: MEDICAL CENTER | Age: 60
End: 2025-02-10
Attending: FAMILY MEDICINE
Payer: COMMERCIAL

## 2025-02-10 DIAGNOSIS — Z12.31 ENCOUNTER FOR SCREENING MAMMOGRAM FOR BREAST CANCER: ICD-10-CM

## 2025-02-10 PROCEDURE — 77067 SCR MAMMO BI INCL CAD: CPT

## 2025-02-13 ENCOUNTER — RESULTS FOLLOW-UP (OUTPATIENT)
Dept: MEDICAL GROUP | Facility: PHYSICIAN GROUP | Age: 60
End: 2025-02-13

## 2025-02-14 ENCOUNTER — HOSPITAL ENCOUNTER (OUTPATIENT)
Dept: LAB | Facility: MEDICAL CENTER | Age: 60
End: 2025-02-14
Attending: FAMILY MEDICINE
Payer: COMMERCIAL

## 2025-02-14 DIAGNOSIS — R11.0 NAUSEA: ICD-10-CM

## 2025-02-14 DIAGNOSIS — Z98.84 HISTORY OF ROUX-EN-Y GASTRIC BYPASS: ICD-10-CM

## 2025-02-14 DIAGNOSIS — Z91.89 AT RISK FOR NUTRITION DEFICIENCY: ICD-10-CM

## 2025-02-14 DIAGNOSIS — Z98.84 HISTORY OF BARIATRIC SURGERY: ICD-10-CM

## 2025-02-14 PROCEDURE — 84207 ASSAY OF VITAMIN B-6: CPT

## 2025-02-14 PROCEDURE — 83550 IRON BINDING TEST: CPT

## 2025-02-14 PROCEDURE — 84446 ASSAY OF VITAMIN E: CPT

## 2025-02-14 PROCEDURE — 84597 ASSAY OF VITAMIN K: CPT

## 2025-02-14 PROCEDURE — 84425 ASSAY OF VITAMIN B-1: CPT

## 2025-02-14 PROCEDURE — 82607 VITAMIN B-12: CPT

## 2025-02-14 PROCEDURE — 83540 ASSAY OF IRON: CPT

## 2025-02-14 PROCEDURE — 84255 ASSAY OF SELENIUM: CPT

## 2025-02-14 PROCEDURE — 84630 ASSAY OF ZINC: CPT

## 2025-02-14 PROCEDURE — 36415 COLL VENOUS BLD VENIPUNCTURE: CPT

## 2025-02-14 PROCEDURE — 84443 ASSAY THYROID STIM HORMONE: CPT

## 2025-02-14 PROCEDURE — 83735 ASSAY OF MAGNESIUM: CPT

## 2025-02-14 PROCEDURE — 82746 ASSAY OF FOLIC ACID SERUM: CPT

## 2025-02-14 PROCEDURE — 82180 ASSAY OF ASCORBIC ACID: CPT

## 2025-02-14 PROCEDURE — 84590 ASSAY OF VITAMIN A: CPT

## 2025-02-15 LAB
FOLATE SERPL-MCNC: 14.7 NG/ML
IRON SATN MFR SERPL: 18 % (ref 15–55)
IRON SERPL-MCNC: 57 UG/DL (ref 40–170)
MAGNESIUM SERPL-MCNC: 2.3 MG/DL (ref 1.5–2.5)
TIBC SERPL-MCNC: 319 UG/DL (ref 250–450)
TSH SERPL DL<=0.005 MIU/L-ACNC: 1.37 UIU/ML (ref 0.38–5.33)
UIBC SERPL-MCNC: 262 UG/DL (ref 110–370)
VIT B12 SERPL-MCNC: 1327 PG/ML (ref 211–911)

## 2025-02-16 LAB
SELENIUM SERPL-MCNC: 141.7 UG/L (ref 23–190)
ZINC SERPL-MCNC: 78.5 UG/DL (ref 60–120)

## 2025-02-17 LAB — VIT B1 BLD-MCNC: 159 NMOL/L (ref 70–180)

## 2025-02-18 LAB
A-TOCOPHEROL VIT E SERPL-MCNC: 12.4 MG/L (ref 5.5–18)
ANNOTATION COMMENT IMP: NORMAL
BETA+GAMMA TOCOPHEROL SERPL-MCNC: 1.3 MG/L (ref 0–6)
RETINYL PALMITATE SERPL-MCNC: <0.02 MG/L (ref 0–0.1)
VIT A SERPL-MCNC: 0.66 MG/L (ref 0.3–1.2)
VIT B6 SERPL-MCNC: 169.9 NMOL/L (ref 20–125)

## 2025-02-19 LAB — VIT C SERPL-MCNC: 85 UMOL/L (ref 23–114)

## 2025-02-25 ENCOUNTER — RESULTS FOLLOW-UP (OUTPATIENT)
Dept: MEDICAL GROUP | Facility: PHYSICIAN GROUP | Age: 60
End: 2025-02-25

## 2025-06-19 ENCOUNTER — HOSPITAL ENCOUNTER (OUTPATIENT)
Dept: LAB | Facility: MEDICAL CENTER | Age: 60
End: 2025-06-19
Attending: FAMILY MEDICINE
Payer: COMMERCIAL

## 2025-06-19 ENCOUNTER — OFFICE VISIT (OUTPATIENT)
Dept: MEDICAL GROUP | Facility: PHYSICIAN GROUP | Age: 60
End: 2025-06-19
Payer: COMMERCIAL

## 2025-06-19 ENCOUNTER — HOSPITAL ENCOUNTER (OUTPATIENT)
Facility: MEDICAL CENTER | Age: 60
End: 2025-06-19
Attending: FAMILY MEDICINE
Payer: COMMERCIAL

## 2025-06-19 VITALS
TEMPERATURE: 98.4 F | BODY MASS INDEX: 30.22 KG/M2 | WEIGHT: 177 LBS | RESPIRATION RATE: 20 BRPM | HEART RATE: 86 BPM | DIASTOLIC BLOOD PRESSURE: 68 MMHG | SYSTOLIC BLOOD PRESSURE: 122 MMHG | OXYGEN SATURATION: 97 % | HEIGHT: 64 IN

## 2025-06-19 DIAGNOSIS — Z12.4 CERVICAL CANCER SCREENING: ICD-10-CM

## 2025-06-19 DIAGNOSIS — Z12.4 CERVICAL CANCER SCREENING: Primary | ICD-10-CM

## 2025-06-19 PROCEDURE — 88142 CYTOPATH C/V THIN LAYER: CPT

## 2025-06-19 PROCEDURE — 3074F SYST BP LT 130 MM HG: CPT | Performed by: FAMILY MEDICINE

## 2025-06-19 PROCEDURE — 84597 ASSAY OF VITAMIN K: CPT

## 2025-06-19 PROCEDURE — 99000 SPECIMEN HANDLING OFFICE-LAB: CPT | Performed by: FAMILY MEDICINE

## 2025-06-19 PROCEDURE — 87624 HPV HI-RISK TYP POOLED RSLT: CPT

## 2025-06-19 PROCEDURE — 99396 PREV VISIT EST AGE 40-64: CPT | Performed by: FAMILY MEDICINE

## 2025-06-19 PROCEDURE — 3078F DIAST BP <80 MM HG: CPT | Performed by: FAMILY MEDICINE

## 2025-06-19 ASSESSMENT — FIBROSIS 4 INDEX: FIB4 SCORE: 1.26

## 2025-06-20 NOTE — PROGRESS NOTES
"Subjective:   Larisa Davis is a 60 y.o. female here today for evaluation and management of:     Cervical cancer screening  Pt is due for pap. Last pap was normal in 2021  Normal pelvic exam.            Current medicines (including changes today)  Current Medications[1]  She  has a past medical history of Fatigue (12/28/2010), GERD (gastroesophageal reflux disease) (12/28/2010), Hypertension, Hypoglycemia (12/28/2010), Irregular menses (12/28/2010), Lactose intolerance (12/28/2010), Menopause, premature (7/24/2012), Menorrhagia (1/18/2011), Nocturnal muscle spasm (12/28/2010), Primary snoring (12/28/2010), and Unspecified vitamin D deficiency (12/28/2010).    ROS  No chest pain, no shortness of breath, no abdominal pain       Objective:     /68   Pulse 86   Temp 36.9 °C (98.4 °F) (Temporal)   Resp 20   Ht 1.626 m (5' 4\")   Wt 80.3 kg (177 lb)   SpO2 97%  Body mass index is 30.38 kg/m².   Physical Exam:  Constitutional: Alert, no distress, well-groomed.  Skin: No rashes in visible areas.  Eye: Round. Conjunctiva clear, lids normal. No icterus.   ENMT: Lips pink without lesions, good dentition, moist mucous membranes. Phonation normal.  Neck: No masses, no thyromegaly. Moves freely without pain.  Respiratory: Unlabored respiratory effort, no cough or audible wheeze  Psych: Alert and oriented x3, normal affect and mood.    Pelvic exam: normal external, no lesions, no vaginal lesions, no cervical lesions, no cervical motion tenderness, no adnexal masses, uterus normal size.     Assessment and Plan:   The following treatment plan was discussed    1. Cervical cancer screening  - Thinprep Pap with HPV; Future      Followup: Return in about 8 months (around 2/19/2026) for follow up.                [1]   Current Outpatient Medications   Medication Sig Dispense Refill    oxybutynin SR (DITROPAN-XL) 10 MG CR tablet Take 1 Tablet by mouth every day. For stress and urge incontinence 90 Tablet 1    " ondansetron (ZOFRAN) 4 MG Tab tablet Take 1 tablt by mouth every 4 hours as needed for nausea or vomiting 20 Tablet 0    cyclobenzaprine (FLEXERIL) 5 mg tablet Take 1 Tablet by mouth 3 times a day as needed for Muscle Spasms. 90 Tablet 3    diclofenac DR (VOLTAREN) 75 MG Tablet Delayed Response TAKE 1 TABLET BY MOUTH TWICE DAILY AS NEEDED FOR PAIN 60 Tablet 3    EPINEPHrine (EPIPEN) 0.3 MG/0.3ML Solution Auto-injector solution for injection FOR SEVERE ALLERGIC REACTION      albuterol 108 (90 Base) MCG/ACT Aero Soln inhalation aerosol Inhale 2 Puffs by mouth every four hours as needed for Shortness of Breath. 1 Inhaler 5    Multiple Vitamin (MULTI VITAMIN DAILY PO) Take  by mouth.      Magnesium 100 MG Cap Take  by mouth.      Calcium Carbonate-Vitamin D (CALCIUM + D PO) Take  by mouth.       No current facility-administered medications for this visit.

## 2025-06-24 LAB — PHYTONADIONE SERPL-MCNC: 6.88 NMOL/L (ref 0.22–4.88)

## 2025-06-26 LAB
HPV I/H RISK 1 DNA SPEC QL NAA+PROBE: NOT DETECTED
SPECIMEN SOURCE: NORMAL
THINPREP PAP, CYTOLOGY NL11781: NORMAL

## 2025-08-11 DIAGNOSIS — M54.50 CHRONIC MIDLINE LOW BACK PAIN WITHOUT SCIATICA: ICD-10-CM

## 2025-08-11 DIAGNOSIS — G89.29 CHRONIC MIDLINE LOW BACK PAIN WITHOUT SCIATICA: ICD-10-CM

## 2025-08-13 RX ORDER — DICLOFENAC SODIUM 75 MG/1
75 TABLET, DELAYED RELEASE ORAL 2 TIMES DAILY PRN
Qty: 60 TABLET | Refills: 3 | Status: SHIPPED | OUTPATIENT
Start: 2025-08-13